# Patient Record
Sex: MALE | Race: WHITE
[De-identification: names, ages, dates, MRNs, and addresses within clinical notes are randomized per-mention and may not be internally consistent; named-entity substitution may affect disease eponyms.]

---

## 2021-04-26 ENCOUNTER — HOSPITAL ENCOUNTER (OUTPATIENT)
Dept: HOSPITAL 83 - LAB | Age: 66
Discharge: HOME | End: 2021-04-26
Attending: FAMILY MEDICINE
Payer: COMMERCIAL

## 2021-04-26 DIAGNOSIS — R79.89: ICD-10-CM

## 2021-04-26 DIAGNOSIS — Z12.5: Primary | ICD-10-CM

## 2021-04-26 DIAGNOSIS — R53.83: ICD-10-CM

## 2021-04-26 LAB
ALBUMIN SERPL-MCNC: 3.6 GM/DL (ref 3.1–4.5)
ALP SERPL-CCNC: 115 U/L (ref 45–117)
ALT SERPL W P-5'-P-CCNC: 17 U/L (ref 12–78)
AST SERPL-CCNC: 11 IU/L (ref 3–35)
BACTERIA #/AREA URNS HPF: (no result) /[HPF]
BASOPHILS # BLD AUTO: 0.1 10*3/UL (ref 0–0.1)
BASOPHILS NFR BLD AUTO: 0.6 % (ref 0–1)
BUN SERPL-MCNC: 14 MG/DL (ref 7–24)
CHLORIDE SERPL-SCNC: 108 MMOL/L (ref 98–107)
CHOLEST SERPL-MCNC: 211 MG/DL (ref ?–200)
CREAT SERPL-MCNC: 1.15 MG/DL (ref 0.7–1.3)
EOSINOPHIL # BLD AUTO: 0.3 10*3/UL (ref 0–0.4)
EOSINOPHIL # BLD AUTO: 3.7 % (ref 1–4)
EPI CELLS #/AREA URNS HPF: (no result) /[HPF]
ERYTHROCYTE [DISTWIDTH] IN BLOOD BY AUTOMATED COUNT: 13.9 % (ref 0–14.5)
HCT VFR BLD AUTO: 46.2 % (ref 42–52)
IRON SERPL-MCNC: 54 UG/DL (ref 65–175)
LDLC SERPL DIRECT ASSAY-MCNC: 148 MG/DL (ref 9–159)
LYMPHOCYTES # BLD AUTO: 2.3 10*3/UL (ref 1.3–4.4)
LYMPHOCYTES NFR BLD AUTO: 29.2 % (ref 27–41)
MCH RBC QN AUTO: 29.8 PG (ref 27–31)
MCHC RBC AUTO-ENTMCNC: 33.8 G/DL (ref 33–37)
MCV RBC AUTO: 88.2 FL (ref 80–94)
MONOCYTES # BLD AUTO: 0.5 10*3/UL (ref 0.1–1)
MONOCYTES NFR BLD MANUAL: 6.6 % (ref 3–9)
MUCOUS THREADS URNS QL MICRO: (no result)
NEUT #: 4.6 10*3/UL (ref 2.3–7.9)
NEUT %: 59.6 % (ref 47–73)
NRBC BLD QL AUTO: 0 10*3/UL (ref 0–0)
PH UR STRIP: 5.5 [PH] (ref 4.5–8)
PLATELET # BLD AUTO: 267 10*3/UL (ref 130–400)
PMV BLD AUTO: 9.8 FL (ref 9.6–12.3)
POTASSIUM SERPL-SCNC: 3.9 MMOL/L (ref 3.5–5.1)
PROT SERPL-MCNC: 7.2 GM/DL (ref 6.4–8.2)
RBC # BLD AUTO: 5.24 10*6/UL (ref 4.5–5.9)
RETICS/RBC NFR AUTO: 0.92 % (ref 0.5–2.5)
SODIUM SERPL-SCNC: 140 MMOL/L (ref 136–145)
SP GR UR: >= 1.03 (ref 1–1.03)
TIBC SERPL-MCNC: 349 UG/DL (ref 250–450)
TRIGL SERPL-MCNC: 142 MG/DL (ref ?–150)
TSH SERPL DL<=0.005 MIU/L-ACNC: 0.47 UIU/ML (ref 0.36–4.75)
UROBILINOGEN UR STRIP-MCNC: 1 E.U./DL (ref 0–1)
WBC #/AREA URNS HPF: (no result) WBC/HPF (ref 0–5)
WBC NRBC COR # BLD AUTO: 7.7 10*3/UL (ref 4.8–10.8)

## 2022-04-03 ENCOUNTER — HOSPITAL ENCOUNTER (INPATIENT)
Age: 67
DRG: 247 | End: 2022-04-03
Attending: INTERNAL MEDICINE | Admitting: INTERNAL MEDICINE
Payer: MEDICARE

## 2022-04-03 ENCOUNTER — HOSPITAL ENCOUNTER (INPATIENT)
Age: 67
LOS: 3 days | Discharge: HOME | DRG: 247 | End: 2022-04-06
Attending: INTERNAL MEDICINE | Admitting: INTERNAL MEDICINE
Payer: MEDICARE

## 2022-04-03 PROBLEM — I21.4 NSTEMI (NON-ST ELEVATED MYOCARDIAL INFARCTION) (HCC): Status: ACTIVE | Noted: 2022-04-03

## 2022-04-03 LAB
ACETAMINOPHEN LEVEL: <5 MCG/ML (ref 10–30)
ALBUMIN SERPL-MCNC: 3.6 G/DL (ref 3.5–5.2)
ALP BLD-CCNC: 107 U/L (ref 40–129)
ALT SERPL-CCNC: 38 U/L (ref 0–40)
ANION GAP SERPL CALCULATED.3IONS-SCNC: 8 MMOL/L (ref 7–16)
APTT: 38 SEC (ref 24.5–35.1)
AST SERPL-CCNC: 225 U/L (ref 0–39)
BASOPHILS ABSOLUTE: 0.01 E9/L (ref 0–0.2)
BASOPHILS RELATIVE PERCENT: 0.1 % (ref 0–2)
BILIRUB SERPL-MCNC: 1.6 MG/DL (ref 0–1.2)
BUN BLDV-MCNC: 21 MG/DL (ref 6–23)
CALCIUM SERPL-MCNC: 8.5 MG/DL (ref 8.6–10.2)
CHLORIDE BLD-SCNC: 108 MMOL/L (ref 98–107)
CO2: 21 MMOL/L (ref 22–29)
CREAT SERPL-MCNC: 1.1 MG/DL (ref 0.7–1.2)
D DIMER: <200 NG/ML DDU
EOSINOPHILS ABSOLUTE: 0 E9/L (ref 0.05–0.5)
EOSINOPHILS RELATIVE PERCENT: 0 % (ref 0–6)
ETHANOL: <10 MG/DL (ref 0–0.08)
GFR AFRICAN AMERICAN: >60
GFR NON-AFRICAN AMERICAN: >60 ML/MIN/1.73
GLUCOSE BLD-MCNC: 118 MG/DL (ref 74–99)
HCT VFR BLD CALC: 38 % (ref 37–54)
HEMOGLOBIN: 13 G/DL (ref 12.5–16.5)
IMMATURE GRANULOCYTES #: 0.06 E9/L
IMMATURE GRANULOCYTES %: 0.5 % (ref 0–5)
LYMPHOCYTES ABSOLUTE: 1.45 E9/L (ref 1.5–4)
LYMPHOCYTES RELATIVE PERCENT: 11.4 % (ref 20–42)
MCH RBC QN AUTO: 30 PG (ref 26–35)
MCHC RBC AUTO-ENTMCNC: 34.2 % (ref 32–34.5)
MCV RBC AUTO: 87.6 FL (ref 80–99.9)
MONOCYTES ABSOLUTE: 1.09 E9/L (ref 0.1–0.95)
MONOCYTES RELATIVE PERCENT: 8.6 % (ref 2–12)
NEUTROPHILS ABSOLUTE: 10.12 E9/L (ref 1.8–7.3)
NEUTROPHILS RELATIVE PERCENT: 79.4 % (ref 43–80)
PDW BLD-RTO: 14.1 FL (ref 11.5–15)
PLATELET # BLD: 228 E9/L (ref 130–450)
PMV BLD AUTO: 10 FL (ref 7–12)
POTASSIUM SERPL-SCNC: 4.2 MMOL/L (ref 3.5–5)
RBC # BLD: 4.34 E12/L (ref 3.8–5.8)
SALICYLATE, SERUM: <0.3 MG/DL (ref 0–30)
SODIUM BLD-SCNC: 137 MMOL/L (ref 132–146)
TOTAL PROTEIN: 6.6 G/DL (ref 6.4–8.3)
TRICYCLIC ANTIDEPRESSANTS SCREEN SERUM: NEGATIVE NG/ML
TROPONIN, HIGH SENSITIVITY: 1556 NG/L (ref 0–11)
TROPONIN, HIGH SENSITIVITY: 2172 NG/L (ref 0–11)
TROPONIN, HIGH SENSITIVITY: 3613 NG/L (ref 0–11)
WBC # BLD: 12.7 E9/L (ref 4.5–11.5)

## 2022-04-03 PROCEDURE — 80179 DRUG ASSAY SALICYLATE: CPT

## 2022-04-03 PROCEDURE — 93005 ELECTROCARDIOGRAM TRACING: CPT | Performed by: INTERNAL MEDICINE

## 2022-04-03 PROCEDURE — 85730 THROMBOPLASTIN TIME PARTIAL: CPT

## 2022-04-03 PROCEDURE — 2700000000 HC OXYGEN THERAPY PER DAY

## 2022-04-03 PROCEDURE — 82077 ASSAY SPEC XCP UR&BREATH IA: CPT

## 2022-04-03 PROCEDURE — 2580000003 HC RX 258: Performed by: STUDENT IN AN ORGANIZED HEALTH CARE EDUCATION/TRAINING PROGRAM

## 2022-04-03 PROCEDURE — 6370000000 HC RX 637 (ALT 250 FOR IP): Performed by: STUDENT IN AN ORGANIZED HEALTH CARE EDUCATION/TRAINING PROGRAM

## 2022-04-03 PROCEDURE — 80074 ACUTE HEPATITIS PANEL: CPT

## 2022-04-03 PROCEDURE — 84484 ASSAY OF TROPONIN QUANT: CPT

## 2022-04-03 PROCEDURE — 80143 DRUG ASSAY ACETAMINOPHEN: CPT

## 2022-04-03 PROCEDURE — 85025 COMPLETE CBC W/AUTO DIFF WBC: CPT

## 2022-04-03 PROCEDURE — 2580000003 HC RX 258: Performed by: INTERNAL MEDICINE

## 2022-04-03 PROCEDURE — 80307 DRUG TEST PRSMV CHEM ANLYZR: CPT

## 2022-04-03 PROCEDURE — 85378 FIBRIN DEGRADE SEMIQUANT: CPT

## 2022-04-03 PROCEDURE — 36415 COLL VENOUS BLD VENIPUNCTURE: CPT

## 2022-04-03 PROCEDURE — 80053 COMPREHEN METABOLIC PANEL: CPT

## 2022-04-03 PROCEDURE — 2000000000 HC ICU R&B

## 2022-04-03 PROCEDURE — 6360000002 HC RX W HCPCS: Performed by: STUDENT IN AN ORGANIZED HEALTH CARE EDUCATION/TRAINING PROGRAM

## 2022-04-03 RX ORDER — ATORVASTATIN CALCIUM 40 MG/1
80 TABLET, FILM COATED ORAL NIGHTLY
Status: DISCONTINUED | OUTPATIENT
Start: 2022-04-03 | End: 2022-04-05

## 2022-04-03 RX ORDER — CLOPIDOGREL BISULFATE 75 MG/1
75 TABLET ORAL DAILY
Status: DISCONTINUED | OUTPATIENT
Start: 2022-04-03 | End: 2022-04-04

## 2022-04-03 RX ORDER — 0.9 % SODIUM CHLORIDE 0.9 %
250 INTRAVENOUS SOLUTION INTRAVENOUS ONCE
Status: COMPLETED | OUTPATIENT
Start: 2022-04-03 | End: 2022-04-03

## 2022-04-03 RX ORDER — SODIUM CHLORIDE 0.9 % (FLUSH) 0.9 %
5-40 SYRINGE (ML) INJECTION EVERY 12 HOURS SCHEDULED
Status: DISCONTINUED | OUTPATIENT
Start: 2022-04-03 | End: 2022-04-04 | Stop reason: SDUPTHER

## 2022-04-03 RX ORDER — HEPARIN SODIUM 10000 [USP'U]/100ML
5-30 INJECTION, SOLUTION INTRAVENOUS CONTINUOUS
Status: DISCONTINUED | OUTPATIENT
Start: 2022-04-03 | End: 2022-04-04

## 2022-04-03 RX ORDER — SODIUM CHLORIDE 0.9 % (FLUSH) 0.9 %
5-40 SYRINGE (ML) INJECTION PRN
Status: DISCONTINUED | OUTPATIENT
Start: 2022-04-03 | End: 2022-04-04 | Stop reason: SDUPTHER

## 2022-04-03 RX ORDER — MORPHINE SULFATE 2 MG/ML
2 INJECTION, SOLUTION INTRAMUSCULAR; INTRAVENOUS EVERY 4 HOURS PRN
Status: DISCONTINUED | OUTPATIENT
Start: 2022-04-03 | End: 2022-04-06 | Stop reason: HOSPADM

## 2022-04-03 RX ORDER — ONDANSETRON 4 MG/1
4 TABLET, ORALLY DISINTEGRATING ORAL EVERY 8 HOURS PRN
Status: DISCONTINUED | OUTPATIENT
Start: 2022-04-03 | End: 2022-04-06 | Stop reason: HOSPADM

## 2022-04-03 RX ORDER — ACETAMINOPHEN 325 MG/1
650 TABLET ORAL EVERY 6 HOURS PRN
Status: DISCONTINUED | OUTPATIENT
Start: 2022-04-03 | End: 2022-04-04 | Stop reason: SDUPTHER

## 2022-04-03 RX ORDER — HEPARIN SODIUM 1000 [USP'U]/ML
4000 INJECTION, SOLUTION INTRAVENOUS; SUBCUTANEOUS ONCE
Status: COMPLETED | OUTPATIENT
Start: 2022-04-03 | End: 2022-04-03

## 2022-04-03 RX ORDER — ONDANSETRON 2 MG/ML
4 INJECTION INTRAMUSCULAR; INTRAVENOUS EVERY 6 HOURS PRN
Status: DISCONTINUED | OUTPATIENT
Start: 2022-04-03 | End: 2022-04-06 | Stop reason: HOSPADM

## 2022-04-03 RX ORDER — PAROXETINE HYDROCHLORIDE 20 MG/1
20 TABLET, FILM COATED ORAL EVERY MORNING
COMMUNITY

## 2022-04-03 RX ORDER — POLYETHYLENE GLYCOL 3350 17 G/17G
17 POWDER, FOR SOLUTION ORAL DAILY PRN
Status: DISCONTINUED | OUTPATIENT
Start: 2022-04-03 | End: 2022-04-06 | Stop reason: HOSPADM

## 2022-04-03 RX ORDER — SODIUM CHLORIDE 9 MG/ML
INJECTION, SOLUTION INTRAVENOUS CONTINUOUS
Status: DISCONTINUED | OUTPATIENT
Start: 2022-04-03 | End: 2022-04-04

## 2022-04-03 RX ORDER — HEPARIN SODIUM 1000 [USP'U]/ML
4000 INJECTION, SOLUTION INTRAVENOUS; SUBCUTANEOUS PRN
Status: DISCONTINUED | OUTPATIENT
Start: 2022-04-03 | End: 2022-04-04 | Stop reason: ALTCHOICE

## 2022-04-03 RX ORDER — SODIUM CHLORIDE 9 MG/ML
INJECTION, SOLUTION INTRAVENOUS PRN
Status: DISCONTINUED | OUTPATIENT
Start: 2022-04-03 | End: 2022-04-04 | Stop reason: SDUPTHER

## 2022-04-03 RX ORDER — HEPARIN SODIUM 1000 [USP'U]/ML
2000 INJECTION, SOLUTION INTRAVENOUS; SUBCUTANEOUS PRN
Status: DISCONTINUED | OUTPATIENT
Start: 2022-04-03 | End: 2022-04-04 | Stop reason: ALTCHOICE

## 2022-04-03 RX ORDER — BUSPIRONE HYDROCHLORIDE 5 MG/1
5 TABLET ORAL DAILY
COMMUNITY

## 2022-04-03 RX ORDER — ASPIRIN 81 MG/1
81 TABLET, CHEWABLE ORAL DAILY
Status: DISCONTINUED | OUTPATIENT
Start: 2022-04-03 | End: 2022-04-06 | Stop reason: HOSPADM

## 2022-04-03 RX ORDER — ACETAMINOPHEN 650 MG/1
650 SUPPOSITORY RECTAL EVERY 6 HOURS PRN
Status: DISCONTINUED | OUTPATIENT
Start: 2022-04-03 | End: 2022-04-06 | Stop reason: HOSPADM

## 2022-04-03 RX ORDER — NITROGLYCERIN 0.4 MG/1
0.4 TABLET SUBLINGUAL EVERY 5 MIN PRN
Status: DISCONTINUED | OUTPATIENT
Start: 2022-04-03 | End: 2022-04-06 | Stop reason: HOSPADM

## 2022-04-03 RX ORDER — TAMSULOSIN HYDROCHLORIDE 0.4 MG/1
0.4 CAPSULE ORAL DAILY
Status: ON HOLD | COMMUNITY
End: 2022-04-19 | Stop reason: HOSPADM

## 2022-04-03 RX ORDER — OMEPRAZOLE 20 MG/1
20 CAPSULE, DELAYED RELEASE ORAL DAILY
COMMUNITY

## 2022-04-03 RX ORDER — DOBUTAMINE HYDROCHLORIDE 400 MG/100ML
2.5-1 INJECTION INTRAVENOUS CONTINUOUS
Status: DISCONTINUED | OUTPATIENT
Start: 2022-04-03 | End: 2022-04-03

## 2022-04-03 RX ADMIN — SODIUM CHLORIDE: 9 INJECTION, SOLUTION INTRAVENOUS at 19:47

## 2022-04-03 RX ADMIN — HEPARIN SODIUM 4000 UNITS: 1000 INJECTION INTRAVENOUS; SUBCUTANEOUS at 17:46

## 2022-04-03 RX ADMIN — ATORVASTATIN CALCIUM 80 MG: 40 TABLET, FILM COATED ORAL at 21:02

## 2022-04-03 RX ADMIN — HEPARIN SODIUM AND DEXTROSE 11 UNITS/KG/HR: 10000; 5 INJECTION INTRAVENOUS at 17:45

## 2022-04-03 RX ADMIN — SODIUM CHLORIDE 250 ML: 9 INJECTION, SOLUTION INTRAVENOUS at 17:50

## 2022-04-03 RX ADMIN — SODIUM CHLORIDE, PRESERVATIVE FREE 10 ML: 5 INJECTION INTRAVENOUS at 21:03

## 2022-04-03 ASSESSMENT — ENCOUNTER SYMPTOMS
ABDOMINAL PAIN: 0
DIARRHEA: 0
BLOOD IN STOOL: 0
COUGH: 0
VOMITING: 0
CHEST TIGHTNESS: 0
CONSTIPATION: 0
NAUSEA: 1
SHORTNESS OF BREATH: 0

## 2022-04-03 ASSESSMENT — PAIN SCALES - GENERAL
PAINLEVEL_OUTOF10: 0

## 2022-04-03 NOTE — CONSULTS
Medical Intensive Care Unit     [facility]  MICU Consult Note    Date and time: 4/3/2022 4:58 PM  Patient's name:  Elsie Lion Record Number: [id number]  Patient's account/billing number: [de-identified]  Patient's YOB: 1955  Age: 73 y.o. Date of Admission: 4/3/2022  2:22 PM  Length of stay during current admission: 0    Primary Care Physician: No primary care provider on file. ICU Attending Physician: Dr. Giorgi Call Status: Full Code    Reason for ICU admission: NSTEMI    History of Present Illness:   Anurag Doherty is a 50-year-old male with past medical history of GERD, BPH and anxiety who presented from [facility] with non-STEMI. He states that he began to have diaphoresis and shortness of breath yesterday after shoveling gravel for 2 hours. Symptoms were not relieved with rest.  He took a shower and continued to be symptomatic. During the night, he felt nauseous and had chills. This morning, he felt more fatigued. His wife thought he was dehydrated took him to the ED. In the ED, his blood pressure was 99/74 and bradycardic at 56. Not in respiratory distress. EKG showed sinus bradycardia with PACs. Repeat EKG 1 hour later showed sloping of ST segments and V1 and V2. Labs are significant for elevated white count 11.9, elevated creatinine 1.4 (he has no baseline), total bili 2,  and . D-dimer was elevated at 594. Trop I cycled 20, 23. CK-. VBG 7.47 / 31 / 57 / 22.6 (respiratory alkalosis). He was given aspirin, Lovenox, NS bolus and Zofran. Was started on dopamine drip. blood pressure slightly improved. Cardiology evaluated there and suspected NSTEMI so he was transferred to [address] [place] [address] ICU. Upon examining the patient, he was in no acute distress. Continued to be hypotensive. Denies chest pain, shortness of breath, abdominal pain, urinary or bowel changes.   Stated he has not been eating or drinking much since yesterday. Denies prior episodes with the symptoms. No cardiac history. Endorses stress test 15 years ago. Family history of diabetes in father. Denies nicotine and alcohol use. Smokes marijuana daily since the 70s. Only takes Paxil, BuSpar, Flomax, Prilosec. CURRENT VENTILATION STATUS:   [] Ventilator  [] BIPAP  [x] Nasal Cannula [] Room Air      IF INTUBATED, ET TUBE MARKING AT LOWER LIP:       cms    SECRETIONS   Amount:  [] Small [] Moderate  [] Large [x] None    Color:     [] White [] Colored  [] Bloody    SEDATION:  RAAS Score:  [] Propofol gtt  [] Versed gtt  [] Ativan gtt   [x] No Sedation    PARALYZED:  [x] No    [] Yes    VASOPRESSORS:  [x] No    [] Yes    If yes -   [] Levophed       [] Dopamine     [] Vasopressin       [] Dobutamine  [] Phenylephrine         [] Epinephrine    CENTRAL LINES:     [x] No   [] Yes   (Date of Insertion:   )           If yes -     [] Right IJ     [] Left IJ [] Right Femoral [] Left Femoral                   [] Right Subclavian [] Left Subclavian    REVIEW OF SYSTEMS:  Review of Systems   Constitutional: Positive for fatigue. Negative for chills, diaphoresis and fever. Respiratory: Negative for cough, chest tightness and shortness of breath. Cardiovascular: Negative for chest pain, palpitations and leg swelling. Gastrointestinal: Positive for nausea. Negative for abdominal pain, blood in stool, constipation, diarrhea and vomiting. Genitourinary: Negative for dysuria and hematuria. Neurological: Negative for dizziness, syncope, light-headedness and headaches.        OBJECTIVE:     VITAL SIGNS:  BP (!) 97/57   Pulse 66   Temp 99.6 °F (37.6 °C) (Oral)   Resp 16   Ht 6' 2\" (1.88 m)   Wt 200 lb (90.7 kg)   SpO2 97%   BMI 25.68 kg/m²   Tmax over 24 hours:  Temp (24hrs), Av.6 °F (37.6 °C), Min:99.6 °F (37.6 °C), Max:99.6 °F (37.6 °C)      Patient Vitals for the past 6 hrs:   BP Temp Temp src Pulse Resp SpO2 Height Weight   22 1640 -- -- -- -- 16 97 % -- --   04/03/22 1530 -- -- -- -- -- -- 6' 2\" (1.88 m) 200 lb (90.7 kg)   04/03/22 1445 (!) 97/57 -- -- 66 18 98 % -- --   04/03/22 1425 92/62 99.6 °F (37.6 °C) Oral 60 23 97 % -- --   04/03/22 1400 99/65 99.6 °F (37.6 °C) Oral 56 22 99 % -- --       No intake or output data in the 24 hours ending 04/03/22 1658  Wt Readings from Last 2 Encounters:   04/03/22 200 lb (90.7 kg)     Body mass index is 25.68 kg/m². PHYSICAL EXAMINATION:  Physical Exam:  Vitals: BP (!) 97/57   Pulse 66   Temp 99.6 °F (37.6 °C) (Oral)   Resp 16   Ht 6' 2\" (1.88 m)   Wt 200 lb (90.7 kg)   SpO2 97%   BMI 25.68 kg/m²     I & O - 24hr: No intake/output data recorded. Physical Exam  Constitutional:       General: He is not in acute distress. Appearance: Normal appearance. HENT:      Mouth/Throat:      Mouth: Mucous membranes are moist.      Pharynx: Oropharynx is clear. Eyes:      General: No scleral icterus. Extraocular Movements: Extraocular movements intact. Conjunctiva/sclera: Conjunctivae normal.   Neck:      Vascular: No carotid bruit or JVD. Cardiovascular:      Rate and Rhythm: Normal rate and regular rhythm. Heart sounds: No murmur heard. Abdominal:      General: Abdomen is flat. Bowel sounds are normal. There is no distension. Palpations: Abdomen is soft. Tenderness: There is no abdominal tenderness. Musculoskeletal:         General: No swelling or tenderness. Cervical back: Normal range of motion and neck supple. Skin:     General: Skin is warm and dry. Capillary Refill: Capillary refill takes less than 2 seconds. Neurological:      General: No focal deficit present. Mental Status: He is alert and oriented to person, place, and time.    Psychiatric:         Mood and Affect: Mood normal.         Behavior: Behavior normal.         MEDICATIONS:  Scheduled Meds:   aspirin  81 mg Oral Daily    clopidogrel  75 mg Oral Daily    atorvastatin  80 mg Oral Nightly heparin (porcine)  4,000 Units IntraVENous Once    sodium chloride flush  5-40 mL IntraVENous 2 times per day     Continuous Infusions:   heparin (PORCINE) Infusion      sodium chloride       PRN Meds:   morphine, 2 mg, Q4H PRN  nitroGLYCERIN, 0.4 mg, Q5 Min PRN  heparin (porcine), 4,000 Units, PRN  heparin (porcine), 2,000 Units, PRN  sodium chloride flush, 5-40 mL, PRN  sodium chloride, , PRN  ondansetron, 4 mg, Q8H PRN   Or  ondansetron, 4 mg, Q6H PRN  polyethylene glycol, 17 g, Daily PRN  acetaminophen, 650 mg, Q6H PRN   Or  acetaminophen, 650 mg, Q6H PRN        VENT SETTINGS (Comprehensive) (if applicable):  Vent Information  SpO2: 97 %  Additional Respiratory  Assessments  Pulse: 66  Resp: 16  SpO2: 97 %    ABGs:   No results for input(s): PH, PCO2, PO2, HCO3, BE, O2SAT in the last 72 hours. Laboratory findings:  Complete Blood Count: No results for input(s): WBC, HGB, HCT, PLT in the last 72 hours. Last 3 Blood Glucose:   No results for input(s): GLUCOSE in the last 72 hours. PT/INR:  No results found for: PROTIME, INR  PTT:  No results found for: APTT, PTT    Comprehensive Metabolic Profile:   No results for input(s): NA, K, CL, CO2, BUN, CREATININE, GLUCOSE, CALCIUM, PROT, LABALBU, BILITOT, ALKPHOS, AST, ALT in the last 72 hours. Magnesium: No results found for: MG  Phosphorus: No results found for: PHOS  Ionized Calcium: No results found for: CAION   Troponin: No results for input(s): TROPONINI in the last 72 hours. Urinalysis: Urine dipstick shows not done. Micro exam: not done. Microbiology:  Notable Cultures:      Blood cultures No results found for: BC  Respiratory cultures No results found for: RESPCULTURE No results found for: LABGRAM  Urine No results found for: LABURIN  Legionella No results found for: LABLEGI  C Diff PCR No results found for: CDIFPCR  Wound culture/abscess: No results for input(s): WNDABS in the last 72 hours.   Tip culture:No results for input(s): CXCATHTIP in the last 72 hours. Radiology/Imaging:   Chest Xray (4/3/2022):    ASSESSMENT  And PLAN:    Efraín Karimi is a 79-year-old male with past medical history of GERD, BPH and anxiety who presented with SOB likely 2/2 NSTEMI. He is currently being managed for:       Cardio  NSTEMI   Presented with 1 day history shortness of breath and diaphoresis  Troponin I 20, 23 and CK- at [facility]  Troponin high-sensitivity here 1556; cycle x2 every 6 hours  Repeat EKG showed sinus rhythm with frequent premature PVCs, with no st segment changes   O2, Nitro SL PRN, Heparin, morphine PRN   plavix and BB on hold   Cardio consulted: cath tomorrow if remains asymptomatic; if worsening sx tonight, emergent cath; NPO at midnight       Pulm  Shortness of breath  Likely 2/2 angina  D-dimer elevated, repeat   Already on heparin drip for NSTEMI if PE was suspected       GI  GERD   On Prilosec at home, continue    Elevated AST   ALT, ALP wnl; bilirubin slightly elevated 1.6  Hepatitis panel ordered   Ethanol level   SDS/UDS       Renal/   MALLIKA  Cr at [facility] 1.4, no baseline   Likely 2/2 decreased p.o. intake   Repeat BMP  NICOM was fluid responsive, give 250 cc bolus       Psych   Anxiety   On buspar and paxel at home, hold for now         ICU PROPHYLAXIS:  Stress ulcer:  [x] PPI Agent  [] [id number] [] Sucralfate  [] Other:  VTE:   [] Enoxaparin  [x] Unfract. Heparin Subcut  [] EPC Cuffs    NUTRITION:  [] NPO [] Tube Feeding (Specify: ) [] TPN  [] PO (Diet: ADULT DIET; Clear Liquid  Diet NPO after midnight)     Maia Lares MD, MD PGY-1  Attending Physician: Dr. Lenora Fischer   4/3/2022, 4:58 PM      I personally saw, examined and provided care for the patient. Radiographs, labs and medication list were reviewed by me independently. I spoke with bedside nursing, therapists and consultants. Critical care services and times documented are independent of procedures and multidisciplinary rounds with Residents.  Additionally comprehensive, multidisciplinary rounds were conducted with the MICU team. The case was discussed in detail and plans for care were established. Review of Residents documentation was conducted and revisions were made as appropriate. I agree with the above documented exam, problem list and plan of care.    Katja Moss MD   CCT excluding procedures :39'

## 2022-04-03 NOTE — CONSULTS
[facility] cardiology/the [address]    Name: Dorenda Bumpers    Age: 73 y.o. Date of Admission: 4/3/2022  2:22 PM    Date of Service: 4/3/2022    Patient known to me as I saw him in the [place] emergency room this morning and reviewed EKGs and case with [place] emergency room physician. Reason for Consultation: Fatigue, shortness of breath, troponin 20 in [place] emergency room with normal being up to 0.1, abnormal EKG. Referring Physician:     History of Present Illness: The patient is a 73y.o. year old male who has been retired for 10 years and has no known history of cardiovascular disease, no diabetes or hypertension hyperlipidemia reports she shoveled to turn a lot yesterday over about 2 or 3 hours and subsequently felt very tired and worn out after that. Subsequently overnight did not feel very well. Just had some generalized fatigue and had trouble motivating to get off the couch due to just generalized fatigue. He had some vague shortness of breath and maybe some midepigastric discomfort but he also reports a history of prior reflux symptoms. He denies any significant diaphoresis or syncope. No history of bleeding issues. Past Medical History: Gastroesophageal reflux disease on Prilosec, BPH on Flomax. Denies any known history of cardiovascular disease. Past surgical history he denies any major surgeries. Review of Systems:     Constitutional: No fever, chills, sweats  Cardiac: As per HPI  Pulmonary: No cough, wheeze, hemoptysis  HEENT: No visual disturbances or difficult swallowing  GI: No nausea, vomiting, diarrhea, abdominal pain, rectal bleeding  : No dysuria or hematuria  Endocrine: No excessive thirst, heat or cold intolerance. Musculoskeletal: No joint pain or muscle aches.  No claudication  Skin: No skin breakdown or rashes  Neuro: No headache, confusion, or seizures  Psych: No depression, anxiety      Family History:  No family history on file. Social History:  Social History     Socioeconomic History    Marital status: Unknown     Spouse name: Not on file    Number of children: Not on file    Years of education: Not on file    Highest education level: Not on file   Occupational History    Not on file   Tobacco Use    Smoking status: Not on file    Smokeless tobacco: Not on file   Substance and Sexual Activity    Alcohol use: Not on file    Drug use: Not on file    Sexual activity: Not on file   Other Topics Concern    Not on file   Social History Narrative    Not on file     Social Determinants of Health     Financial Resource Strain:     Difficulty of Paying Living Expenses: Not on file   Food Insecurity:     Worried About Running Out of Food in the Last Year: Not on file    Mabel of Food in the Last Year: Not on file   Transportation Needs:     Lack of Transportation (Medical): Not on file    Lack of Transportation (Non-Medical):  Not on file   Physical Activity:     Days of Exercise per Week: Not on file    Minutes of Exercise per Session: Not on file   Stress:     Feeling of Stress : Not on file   Social Connections:     Frequency of Communication with Friends and Family: Not on file    Frequency of Social Gatherings with Friends and Family: Not on file    Attends Congregational Services: Not on file    Active Member of [address] [place] [address] [employer] or Organizations: Not on file    Attends Club or Organization Meetings: Not on file    Marital Status: Not on file   Intimate Partner Violence:     Fear of Current or Ex-Partner: Not on file    Emotionally Abused: Not on file    Physically Abused: Not on file    Sexually Abused: Not on file   Housing Stability:     Unable to Pay for Housing in the Last Year: Not on file    Number of [place] in the Last Year: Not on file    Unstable Housing in the Last Year: Not on file       Allergies:  No Known Allergies    Current Medications:  No current facility-administered medications for this encounter. Physical Exam:  BP (!) 97/57   Pulse 66   Temp 99.6 °F (37.6 °C) (Oral)   Resp 18   SpO2 98%   Weight change: Wt Readings from Last 3 Encounters:   No data found for Wt         General: Awake, alert, oriented x3, no acute distress  HEENT: Unremarkable  Neck: No JVD or bruits. Cardiac: Regular rate and rhythm, normal S1 and S2, no extra heart sounds, murmurs, heaves, thrills  Resp: Lungs clear without wheezing or crackles. No accessory muscle use or retraction  Abdomen: soft, nontender, nondistended, no gross organomegaly or mass  Skin: Warm and dry, no cyanosis. Musculoskeletal: normal tone and strength in the upper and lower extremities bilaterally  Neuro: Grossly unremarkable  Psych: Cooperative, and normal affect    Intake/Output:  No intake or output data in the 24 hours ending 04/03/22 1519  No intake/output data recorded. Laboratory Tests:  No results found for: CREATININE, BUN, NA, K, CL, CO2  No results for input(s): CKTOTAL, CKMB in the last 72 hours. Invalid input(s): TROPONONI  No results found for: BNP  No results found for: WBC, RBC, HGB, HCT, MCV, MCH, MCHC, RDW, PLT, MPV  No results for input(s): ALKPHOS, ALT, AST, PROT, BILITOT, BILIDIR, LABALBU in the last 72 hours. No results found for: MG  No results found for: PROTIME, INR  No results found for: TSH  No components found for: CHLPL  No results found for: TRIG  No results found for: HDL  No results found for: LDLCALC  No results found for: INR    Admission ECG approximately 930 this morning in [place] personally reviewed by me revealed normal sinus rhythm without ST elevation or ST depression but follow-up EKG at about 10:30 AM with new upsloping ST depression leads V1 and V2 with PVCs noted. Personally reviewed his telemetry and currently normal sinus rhythm heart rate about 60 with unifocal PVCs.     ASSESSMENT / PLAN:    1. Non-ST elevation MI based on troponin of 20 with normal reference up to 0.1 in [place], also they checked an MB and CPK-MB was 250. In [facility] given aspirin, beta-blocker, full dose Lovenox 1 mg/kg. Would also start atorvastatin 40 mg p.o. daily. He is an active functional gentleman and I discussed risks benefits and alternatives and options of management of likely recent acute coronary syndrome and I presented to him consideration of heart catheterization with possible percutaneous intervention all questions have been answered in the presence of his wife and he is agreed to proceed. Currently stable without chest pain or shortness of breath or distress. N.p.o. after midnight and plan heart catheterization tomorrow. The appropriate use criteria indication of 3 score of 8    #2 abnormal EKG with ST depression on the second EKG now resolved on EKG here at [facility]. #3 premature ventricular contractions appear to be unifocal.  Keep potassium 4 or greater magnesium 2 or greater. #4 smokes marijuana on a daily basis and I discussed with him this still has adverse cardiovascular effects and smoking cessation education reviewed with him and his wife. #5 bradycardia transiently heart rate in the mid 50s on no AV blocking agents. Probably would hold on a beta-blocker at this time. Would make him n.p.o. after midnight and clear liquids the rest of today as his stomach is upset by his report. Would not load him necessarily with Plavix at this time and would like to see coronary anatomy tomorrow before giving dual antiplatelet therapy.                 Electronically signed by Kb Dykes MD on 4/3/2022 at 3:19 PM

## 2022-04-03 NOTE — H&P
Inpatient H&P      PCP:  No primary care provider on file. Admitting Physician:  Mariel Rice DO  Consultants:  Cardiology, critical care  Chief Complaint:  No chief complaint on file. History of Present Illness  Sarina Barth is a 73 y.o. male who presents to [facility] complaining of fatigue, SOB. Sarina Barth has a past medical history that includes kidney stones, GERD, depression and anxiety. Lamont Butcher initially presented to [facility] with complaints of fatigue and shortness of breath. He was doing work outside yesterday shoveling 6000 pounds of gravel and subsequently felt very tired and worn out after that. Overnight he progressively felt more unwell. He admitted to generalized fatigue and some shortness of breath. He admitted to midepigastric discomfort. Admission ECG at [facility] revealed normal sinus rhythm without ST elevation or depressions, but follow-up EKG with new upsloping ST depressions in leads V1 and V2 with PVCs. Decision was made to transfer patient to [facility] for likely need for heart catheterization. Last Hospital Admission -   None in EMR    Last Echocardiogram -   None in EMR     ED TRIAGE VITALS  BP: (!) 94/56, Temp: 99.6 °F (37.6 °C), Pulse: 67, Resp: 16, SpO2: 97 %    Vitals:    04/03/22 1445 04/03/22 1530 04/03/22 1600 04/03/22 1640   BP: (!) 97/57  (!) 94/56    Pulse: 66  67    Resp: 18  14 16   Temp:       TempSrc:       SpO2: 98%  98% 97%   Weight:  200 lb (90.7 kg)     Height:  6' 2\" (1.88 m)           Histories  Past Medical History:   Diagnosis Date    Kidney stones      Past Surgical History:   Procedure Laterality Date    KIDNEY STONE SURGERY      2010     Family History   Problem Relation Age of Onset    Diabetes Father        Home Medications  Prior to Admission medications    Medication Sig Start Date End Date Taking?  Authorizing Provider   omeprazole (PRILOSEC) 20 MG delayed release capsule Take 20 mg by mouth daily   Yes Historical Provider, MD   busPIRone (BUSPAR) 5 MG tablet Take 5 mg by mouth daily   Yes Historical Provider, MD   PARoxetine (PAXIL) 20 MG tablet Take 20 mg by mouth every morning   Yes Historical Provider, MD   tamsulosin (FLOMAX) 0.4 MG capsule Take 0.4 mg by mouth daily   Yes Historical Provider, MD       Allergies  Patient has no known allergies. Social Hx  Social History     Socioeconomic History    Marital status:      Spouse name: Arun Ortega Number of children: 0    Years of education: Not on file    Highest education level: Not on file   Occupational History     Comment: retired   Tobacco Use    Smoking status: Current Every Day Smoker     Years: 50.00    Smokeless tobacco: Never Used   Vaping Use    Vaping Use: Some days    Substances: CBD, marijuana    Devices: Pre-filled pod   Substance and Sexual Activity    Alcohol use: Not Currently     Comment: does nopt drink alcohol    Drug use: Yes     Types: Marijuana (Weed)     Comment: smokes marijuana several times daily/no cigs    Sexual activity: Yes     Partners: Female   Other Topics Concern    Not on file   Social History Narrative    Not on file     Social Determinants of Health     Financial Resource Strain:     Difficulty of Paying Living Expenses: Not on file   Food Insecurity:     Worried About Running Out of Food in the Last Year: Not on file    Mabel of Food in the Last Year: Not on file   Transportation Needs:     Lack of Transportation (Medical): Not on file    Lack of Transportation (Non-Medical):  Not on file   Physical Activity:     Days of Exercise per Week: Not on file    Minutes of Exercise per Session: Not on file   Stress:     Feeling of Stress : Not on file   Social Connections:     Frequency of Communication with Friends and Family: Not on file    Frequency of Social Gatherings with Friends and Family: Not on file    Attends Scientologist Services: Not on file    Active Member of Clubs or Organizations: Not on file   [facility] Attends Club or Organization Meetings: Not on file    Marital Status: Not on file   Intimate Partner Violence:     Fear of Current or Ex-Partner: Not on file    Emotionally Abused: Not on file    Physically Abused: Not on file    Sexually Abused: Not on file   Housing Stability:     Unable to Pay for Housing in the Last Year: Not on file    Number of Grey in the Last Year: Not on file    Unstable Housing in the Last Year: Not on file       Review of Systems  All bolded are positive; please see HPI  General:  Fever, chills, diaphoresis, fatigue, malaise, night sweats, weight loss  Psychological:  Anxiety, disorientation, hallucinations. ENT:  Epistaxis, headaches, vertigo, visual changes. Cardiovascular:  Chest pain, irregular heartbeats, palpitations, paroxysmal nocturnal dyspnea. Respiratory:  Shortness of breath, coughing, sputum production, hemoptysis, wheezing, orthopnea.   Gastrointestinal:  Nausea, vomiting, diarrhea, heartburn, constipation, abdominal pain, hematemesis, hematochezia, melena, acholic stools  Genito-Urinary:  Dysuria, urgency, frequency, hematuria  Musculoskeletal:  Joint pain, joint stiffness, joint swelling, muscle pain  Neurology:  Headache, focal neurological deficits, weakness, numbness, paresthesia  Derm:  Rashes, ulcers, excoriations, bruising  Extremities:  Decreased ROM, peripheral edema, mottling    Physical Examination  Vitals:  BP (!) 94/56   Pulse 67   Temp 99.6 °F (37.6 °C) (Oral)   Resp 16   Ht 6' 2\" (1.88 m)   Wt 200 lb (90.7 kg)   SpO2 97%   BMI 25.68 kg/m²   General Appearance:  awake, alert, and oriented to person, place, time, and purpose; appears stated age and cooperative; no apparent distress no labored breathing  HEENT:  NCAT; PERRL; conjunctiva pink, sclera clear  Neck:  no adenopathy, bruit, JVD, tenderness, masses, or nodules; supple, symmetrical, trachea midline, thyroid not enlarged  Lung:  clear to auscultation bilaterally; no use of accessory muscles; no rhonchi, rales, or crackles  Heart:  regular rate and regular rhythm without murmur, rub, or gallop  Abdomen:  soft, nontender, nondistended; normoactive bowel sounds; no organomegaly  Extremities:  extremities normal, atraumatic, no cyanosis or edema  Musculokeletal:  no joint swelling, no muscle tenderness. ROM normal in all joints of extremities. Neurologic:  mental status A&Ox3, thought content appropriate; CN II-XII grossly intact; sensation intact, motor strength 5/5 globally; no slurred speech    Laboratory Data  Recent Results (from the past 24 hour(s))   CBC with Auto Differential    Collection Time: 04/03/22 11:20 AM   Result Value Ref Range    WBC 12.7 (H) 4.5 - 11.5 E9/L    RBC 4.34 3.80 - 5.80 E12/L    Hemoglobin 13.0 12.5 - 16.5 g/dL    Hematocrit 38.0 37.0 - 54.0 %    MCV 87.6 80.0 - 99.9 fL    MCH 30.0 26.0 - 35.0 pg    MCHC 34.2 32.0 - 34.5 %    RDW 14.1 11.5 - 15.0 fL    Platelets 773 196 - 056 E9/L    MPV 10.0 7.0 - 12.0 fL    Neutrophils % 79.4 43.0 - 80.0 %    Immature Granulocytes % 0.5 0.0 - 5.0 %    Lymphocytes % 11.4 (L) 20.0 - 42.0 %    Monocytes % 8.6 2.0 - 12.0 %    Eosinophils % 0.0 0.0 - 6.0 %    Basophils % 0.1 0.0 - 2.0 %    Neutrophils Absolute 10.12 (H) 1.80 - 7.30 E9/L    Immature Granulocytes # 0.06 E9/L    Lymphocytes Absolute 1.45 (L) 1.50 - 4.00 E9/L    Monocytes Absolute 1.09 (H) 0.10 - 0.95 E9/L    Eosinophils Absolute 0.00 (L) 0.05 - 0.50 E9/L    Basophils Absolute 0.01 0.00 - 0.20 E9/L   Troponin    Collection Time: 04/03/22  2:32 PM   Result Value Ref Range    Troponin, High Sensitivity 1,556 (H) 0 - 11 ng/L       Imaging  No results found. Assessment and Plan  Patient is a 73 y.o. male who presented with fatigue, SOB. The active problem list is as follows:    · NSTEMI-  Troponin 1,556>2,172. Aspirin/Statin. Cardiology following. Will likely need cardiac catheterization. NPO after midnight. On heparin drip.    · Hypotension- given BB at dion, continue to monitor   · Elevated AST  · Leukocytosis  · Marijuana use  · Bradycardia  · GERD  · History of Kidney stones  · Depression and anxiety      · Routine labs in the morning. · DVT prophylaxis. · Please see orders for further management and care.         Drew Ngo DO    5:53 PM  4/3/2022

## 2022-04-04 ENCOUNTER — APPOINTMENT (OUTPATIENT)
Dept: GENERAL RADIOLOGY | Age: 67
DRG: 247 | End: 2022-04-04
Attending: INTERNAL MEDICINE
Payer: MEDICARE

## 2022-04-04 PROBLEM — R07.9 CHEST PAIN: Status: ACTIVE | Noted: 2022-04-04

## 2022-04-04 LAB
ABO/RH: NORMAL
ALBUMIN SERPL-MCNC: 3.6 G/DL (ref 3.5–5.2)
ALP BLD-CCNC: 102 U/L (ref 40–129)
ALT SERPL-CCNC: 49 U/L (ref 0–40)
ANION GAP SERPL CALCULATED.3IONS-SCNC: 11 MMOL/L (ref 7–16)
ANTIBODY SCREEN: NORMAL
APTT: 35.9 SEC (ref 24.5–35.1)
APTT: 71.1 SEC (ref 24.5–35.1)
AST SERPL-CCNC: 223 U/L (ref 0–39)
BILIRUB SERPL-MCNC: 2.5 MG/DL (ref 0–1.2)
BUN BLDV-MCNC: 21 MG/DL (ref 6–23)
CALCIUM SERPL-MCNC: 8.1 MG/DL (ref 8.6–10.2)
CHLORIDE BLD-SCNC: 106 MMOL/L (ref 98–107)
CO2: 20 MMOL/L (ref 22–29)
CREAT SERPL-MCNC: 1.1 MG/DL (ref 0.7–1.2)
EKG ATRIAL RATE: 54 BPM
EKG ATRIAL RATE: 55 BPM
EKG P AXIS: 52 DEGREES
EKG P AXIS: 52 DEGREES
EKG P-R INTERVAL: 152 MS
EKG P-R INTERVAL: 154 MS
EKG Q-T INTERVAL: 408 MS
EKG Q-T INTERVAL: 424 MS
EKG QRS DURATION: 80 MS
EKG QRS DURATION: 84 MS
EKG QTC CALCULATION (BAZETT): 386 MS
EKG QTC CALCULATION (BAZETT): 405 MS
EKG R AXIS: -14 DEGREES
EKG R AXIS: -15 DEGREES
EKG T AXIS: -59 DEGREES
EKG T AXIS: -64 DEGREES
EKG VENTRICULAR RATE: 54 BPM
EKG VENTRICULAR RATE: 55 BPM
GFR AFRICAN AMERICAN: >60
GFR NON-AFRICAN AMERICAN: >60 ML/MIN/1.73
GLUCOSE BLD-MCNC: 118 MG/DL (ref 74–99)
HAV IGM SER IA-ACNC: NORMAL
HCT VFR BLD CALC: 37.4 % (ref 37–54)
HEMOGLOBIN: 12.5 G/DL (ref 12.5–16.5)
HEPATITIS B CORE IGM ANTIBODY: NORMAL
HEPATITIS B SURFACE ANTIGEN INTERPRETATION: NORMAL
HEPATITIS C ANTIBODY INTERPRETATION: NORMAL
LV EF: 53 %
LVEF MODALITY: NORMAL
MCH RBC QN AUTO: 30.2 PG (ref 26–35)
MCHC RBC AUTO-ENTMCNC: 33.4 % (ref 32–34.5)
MCV RBC AUTO: 90.3 FL (ref 80–99.9)
PDW BLD-RTO: 14.5 FL (ref 11.5–15)
PLATELET # BLD: 198 E9/L (ref 130–450)
PMV BLD AUTO: 10.6 FL (ref 7–12)
POC ACT LR: 166 SECONDS
POC ACT LR: 270 SECONDS
POTASSIUM SERPL-SCNC: 4.2 MMOL/L (ref 3.5–5)
PRO-BNP: 2648 PG/ML (ref 0–125)
RBC # BLD: 4.14 E12/L (ref 3.8–5.8)
SARS-COV-2, NAAT: NOT DETECTED
SODIUM BLD-SCNC: 137 MMOL/L (ref 132–146)
TOTAL PROTEIN: 6.2 G/DL (ref 6.4–8.3)
WBC # BLD: 11.9 E9/L (ref 4.5–11.5)

## 2022-04-04 PROCEDURE — 93010 ELECTROCARDIOGRAM REPORT: CPT | Performed by: INTERNAL MEDICINE

## 2022-04-04 PROCEDURE — 93458 L HRT ARTERY/VENTRICLE ANGIO: CPT

## 2022-04-04 PROCEDURE — 2700000000 HC OXYGEN THERAPY PER DAY

## 2022-04-04 PROCEDURE — 71045 X-RAY EXAM CHEST 1 VIEW: CPT

## 2022-04-04 PROCEDURE — 92943 PRQ TRLUML REVSC CH OCC ANT: CPT | Performed by: INTERNAL MEDICINE

## 2022-04-04 PROCEDURE — C1769 GUIDE WIRE: HCPCS

## 2022-04-04 PROCEDURE — 85027 COMPLETE CBC AUTOMATED: CPT

## 2022-04-04 PROCEDURE — 6360000002 HC RX W HCPCS: Performed by: STUDENT IN AN ORGANIZED HEALTH CARE EDUCATION/TRAINING PROGRAM

## 2022-04-04 PROCEDURE — B2111ZZ FLUOROSCOPY OF MULTIPLE CORONARY ARTERIES USING LOW OSMOLAR CONTRAST: ICD-10-PCS | Performed by: INTERNAL MEDICINE

## 2022-04-04 PROCEDURE — C9600 PERC DRUG-EL COR STENT SING: HCPCS

## 2022-04-04 PROCEDURE — 4A023N7 MEASUREMENT OF CARDIAC SAMPLING AND PRESSURE, LEFT HEART, PERCUTANEOUS APPROACH: ICD-10-PCS | Performed by: INTERNAL MEDICINE

## 2022-04-04 PROCEDURE — 92973 PRQ TRLUML C MCHN ASP THRMBC: CPT

## 2022-04-04 PROCEDURE — 6370000000 HC RX 637 (ALT 250 FOR IP): Performed by: INTERNAL MEDICINE

## 2022-04-04 PROCEDURE — 2709999900 HC NON-CHARGEABLE SUPPLY

## 2022-04-04 PROCEDURE — 2580000003 HC RX 258: Performed by: INTERNAL MEDICINE

## 2022-04-04 PROCEDURE — 93458 L HRT ARTERY/VENTRICLE ANGIO: CPT | Performed by: INTERNAL MEDICINE

## 2022-04-04 PROCEDURE — C1887 CATHETER, GUIDING: HCPCS

## 2022-04-04 PROCEDURE — 6370000000 HC RX 637 (ALT 250 FOR IP): Performed by: STUDENT IN AN ORGANIZED HEALTH CARE EDUCATION/TRAINING PROGRAM

## 2022-04-04 PROCEDURE — 80053 COMPREHEN METABOLIC PANEL: CPT

## 2022-04-04 PROCEDURE — 86901 BLOOD TYPING SEROLOGIC RH(D): CPT

## 2022-04-04 PROCEDURE — 2000000000 HC ICU R&B

## 2022-04-04 PROCEDURE — 93005 ELECTROCARDIOGRAM TRACING: CPT | Performed by: INTERNAL MEDICINE

## 2022-04-04 PROCEDURE — 85730 THROMBOPLASTIN TIME PARTIAL: CPT

## 2022-04-04 PROCEDURE — 86850 RBC ANTIBODY SCREEN: CPT

## 2022-04-04 PROCEDURE — 99233 SBSQ HOSP IP/OBS HIGH 50: CPT | Performed by: INTERNAL MEDICINE

## 2022-04-04 PROCEDURE — 93306 TTE W/DOPPLER COMPLETE: CPT

## 2022-04-04 PROCEDURE — C1725 CATH, TRANSLUMIN NON-LASER: HCPCS

## 2022-04-04 PROCEDURE — 83880 ASSAY OF NATRIURETIC PEPTIDE: CPT

## 2022-04-04 PROCEDURE — B2151ZZ FLUOROSCOPY OF LEFT HEART USING LOW OSMOLAR CONTRAST: ICD-10-PCS | Performed by: INTERNAL MEDICINE

## 2022-04-04 PROCEDURE — C1874 STENT, COATED/COV W/DEL SYS: HCPCS

## 2022-04-04 PROCEDURE — 87635 SARS-COV-2 COVID-19 AMP PRB: CPT

## 2022-04-04 PROCEDURE — 2720000010 HC SURG SUPPLY STERILE

## 2022-04-04 PROCEDURE — 85347 COAGULATION TIME ACTIVATED: CPT

## 2022-04-04 PROCEDURE — 6360000002 HC RX W HCPCS

## 2022-04-04 PROCEDURE — C1894 INTRO/SHEATH, NON-LASER: HCPCS

## 2022-04-04 PROCEDURE — 36415 COLL VENOUS BLD VENIPUNCTURE: CPT

## 2022-04-04 PROCEDURE — 2500000003 HC RX 250 WO HCPCS

## 2022-04-04 PROCEDURE — 6360000004 HC RX CONTRAST MEDICATION: Performed by: INTERNAL MEDICINE

## 2022-04-04 PROCEDURE — 6370000000 HC RX 637 (ALT 250 FOR IP)

## 2022-04-04 PROCEDURE — 027034Z DILATION OF CORONARY ARTERY, ONE ARTERY WITH DRUG-ELUTING INTRALUMINAL DEVICE, PERCUTANEOUS APPROACH: ICD-10-PCS | Performed by: INTERNAL MEDICINE

## 2022-04-04 PROCEDURE — 86900 BLOOD TYPING SEROLOGIC ABO: CPT

## 2022-04-04 RX ORDER — 0.9 % SODIUM CHLORIDE 0.9 %
500 INTRAVENOUS SOLUTION INTRAVENOUS ONCE
Status: COMPLETED | OUTPATIENT
Start: 2022-04-04 | End: 2022-04-04

## 2022-04-04 RX ORDER — SODIUM CHLORIDE 0.9 % (FLUSH) 0.9 %
5-40 SYRINGE (ML) INJECTION PRN
Status: DISCONTINUED | OUTPATIENT
Start: 2022-04-04 | End: 2022-04-06 | Stop reason: HOSPADM

## 2022-04-04 RX ORDER — PAROXETINE HYDROCHLORIDE 20 MG/1
20 TABLET, FILM COATED ORAL DAILY
Status: DISCONTINUED | OUTPATIENT
Start: 2022-04-04 | End: 2022-04-06 | Stop reason: HOSPADM

## 2022-04-04 RX ORDER — ACETAMINOPHEN 325 MG/1
650 TABLET ORAL EVERY 4 HOURS PRN
Status: DISCONTINUED | OUTPATIENT
Start: 2022-04-04 | End: 2022-04-06 | Stop reason: HOSPADM

## 2022-04-04 RX ORDER — SODIUM CHLORIDE 9 MG/ML
INJECTION, SOLUTION INTRAVENOUS CONTINUOUS
Status: ACTIVE | OUTPATIENT
Start: 2022-04-04 | End: 2022-04-04

## 2022-04-04 RX ORDER — BUSPIRONE HYDROCHLORIDE 5 MG/1
5 TABLET ORAL DAILY
Status: DISCONTINUED | OUTPATIENT
Start: 2022-04-04 | End: 2022-04-06 | Stop reason: HOSPADM

## 2022-04-04 RX ORDER — SODIUM CHLORIDE 0.9 % (FLUSH) 0.9 %
5-40 SYRINGE (ML) INJECTION EVERY 12 HOURS SCHEDULED
Status: DISCONTINUED | OUTPATIENT
Start: 2022-04-04 | End: 2022-04-06 | Stop reason: HOSPADM

## 2022-04-04 RX ORDER — SODIUM CHLORIDE 9 MG/ML
25 INJECTION, SOLUTION INTRAVENOUS PRN
Status: DISCONTINUED | OUTPATIENT
Start: 2022-04-04 | End: 2022-04-06 | Stop reason: HOSPADM

## 2022-04-04 RX ADMIN — PERFLUTREN 1.65 MG: 6.52 INJECTION, SUSPENSION INTRAVENOUS at 09:15

## 2022-04-04 RX ADMIN — HEPARIN SODIUM 2000 UNITS: 1000 INJECTION INTRAVENOUS; SUBCUTANEOUS at 08:19

## 2022-04-04 RX ADMIN — TICAGRELOR 90 MG: 90 TABLET ORAL at 20:19

## 2022-04-04 RX ADMIN — SODIUM CHLORIDE 500 ML: 9 INJECTION, SOLUTION INTRAVENOUS at 03:17

## 2022-04-04 RX ADMIN — ASPIRIN 81 MG 81 MG: 81 TABLET ORAL at 08:19

## 2022-04-04 RX ADMIN — BUSPIRONE HYDROCHLORIDE 5 MG: 5 TABLET ORAL at 15:58

## 2022-04-04 RX ADMIN — SODIUM CHLORIDE: 9 INJECTION, SOLUTION INTRAVENOUS at 05:21

## 2022-04-04 RX ADMIN — SODIUM CHLORIDE, PRESERVATIVE FREE 10 ML: 5 INJECTION INTRAVENOUS at 20:20

## 2022-04-04 RX ADMIN — SODIUM CHLORIDE, PRESERVATIVE FREE 10 ML: 5 INJECTION INTRAVENOUS at 10:53

## 2022-04-04 RX ADMIN — PAROXETINE 20 MG: 20 TABLET, FILM COATED ORAL at 15:58

## 2022-04-04 ASSESSMENT — PAIN SCALES - GENERAL
PAINLEVEL_OUTOF10: 0

## 2022-04-04 NOTE — PROGRESS NOTES
[facility]  CARDIOLOGY PROGRESS NOTE  [facility]        Subjective: Non-ST elevation myocardial infarction with very vague fatigue and tiredness but no chest symptoms or significant exertional limitations that I could elicit from his history. Last evening hypotension systolic in the 76E and received a couple of boluses IV saline and then maintenance normal saline overnight. He had no chest pain overnight. Heart catheterization completed today showed dominant left circumflex large vessel 100% mid occlusion and clot present. This afternoon appears to be comfortable had an episode of some diaphoresis about 2 hours after the heart catheterization. Objective: Medications lab chart and telemetry all reviewed.     Patient Vitals for the past 24 hrs:   BP Temp Temp src Pulse Resp SpO2 Weight   04/04/22 1600 129/78 -- -- 69 20 99 % --   04/04/22 1500 107/69 -- -- 65 21 98 % --   04/04/22 1400 98/73 -- -- 75 18 98 % --   04/04/22 1300 126/72 -- -- 59 18 97 % --   04/04/22 1200 99/76 98 °F (36.7 °C) Oral 76 16 96 % --   04/04/22 1100 104/68 -- -- 54 18 96 % --   04/04/22 1045 (!) 88/58 -- -- 54 14 97 % --   04/04/22 0830 112/74 -- -- 58 18 97 % --   04/04/22 0800 103/68 96.8 °F (36 °C) Oral 62 11 98 % --   04/04/22 0730 92/65 -- -- 54 24 94 % --   04/04/22 0700 98/66 -- -- 68 18 97 % --   04/04/22 0630 100/67 -- -- (!) 49 26 91 % --   04/04/22 0600 88/60 -- -- 54 27 93 % 202 lb 2.6 oz (91.7 kg)   04/04/22 0530 99/66 -- -- 61 22 92 % --   04/04/22 0500 (!) 90/57 -- -- 54 29 94 % --   04/04/22 0430 93/65 -- -- 54 27 91 % --   04/04/22 0400 (!) 89/58 97.7 °F (36.5 °C) -- 55 20 93 % --   04/04/22 0300 (!) 88/56 -- -- 54 25 94 % --   04/04/22 0200 (!) 89/57 -- -- 54 26 95 % --   04/04/22 0100 94/65 -- -- (!) 49 15 94 % --   04/04/22 0000 (!) 75/52 98.8 °F (37.1 °C) -- 56 25 96 % --   04/03/22 2330 (!) 76/47 -- -- 51 25 95 % --   04/03/22 2300 (!) 72/44 -- -- 56 22 95 % --   04/03/22 2230 (!) 79/50 -- -- 53 21 96 % --   04/03/22 2200 (!) 84/54 -- -- 55 22 95 % --   04/03/22 2130 (!) 88/57 -- -- 56 24 95 % --   04/03/22 2100 (!) 90/58 -- -- 60 25 95 % --   04/03/22 2030 (!) 86/58 -- -- 54 17 96 % --   04/03/22 2000 92/61 99 °F (37.2 °C) Oral 57 19 96 % --   04/03/22 1900 96/67 -- -- 53 17 96 % --   04/03/22 1846 (!) 92/58 -- -- 56 23 97 % --   04/03/22 1800 (!) 89/58 -- -- 56 16 98 % --   04/03/22 1700 (!) 92/56 -- -- 56 20 97 % --   04/03/22 1640 -- -- -- -- 16 97 % --         Intake/Output Summary (Last 24 hours) at 4/4/2022 1617  Last data filed at 4/4/2022 1610  Gross per 24 hour   Intake 3298.02 ml   Output 1025 ml   Net 2273.02 ml       Wt Readings from Last 3 Encounters:   04/04/22 202 lb 2.6 oz (91.7 kg)       Telemetry: Personally reviewed and shows sinus bradycardia no AV block or long pauses. Current meds: Scheduled Meds:   sodium chloride flush  5-40 mL IntraVENous 2 times per day    ticagrelor  90 mg Oral BID    busPIRone  5 mg Oral Daily    PARoxetine  20 mg Oral Daily    aspirin  81 mg Oral Daily    [Held by provider] atorvastatin  80 mg Oral Nightly     Continuous Infusions:   sodium chloride      sodium chloride       PRN Meds:.sodium chloride flush, sodium chloride, acetaminophen, morphine, nitroGLYCERIN, ondansetron **OR** ondansetron, polyethylene glycol, [DISCONTINUED] acetaminophen **OR** acetaminophen    Allergies: Patient has no known allergies. Labs:   Recent Labs     04/03/22  1120 04/04/22  0827   WBC 12.7* 11.9*   HGB 13.0 12.5   HCT 38.0 37.4   MCV 87.6 90.3    198       Labs:   Recent Labs     04/03/22  1120 04/04/22  0827    137   K 4.2 4.2   * 106   CO2 21* 20*   BUN 21 21   CREATININE 1.1 1.1       Labs: No results for input(s): CKTOTAL, CKMB, CKMBINDEX, TROPONINI in the last 72 hours. Labs: No results for input(s): BNP in the last 72 hours. Labs: No results for input(s): CHOL, HDL, TRIG in the last 72 hours.     Invalid input(s): [address], LDLCALCU    Labs:   Recent Labs     04/03/22  1120 04/04/22  0827   PROT 6.6 6.2*       Review of systems: No reported significant weight gain or weight loss. no dysuria or frequency, no dizziness, falls or trauma, no change in bowel or bladder habits, no hematochezia, hemoptysis or hematuria. No fevers, chills, nausea or vomiting reported. No significant wheezing or sputum production. No headache or visual changes. The remainder of the 10 review of systems otherwise negative. Exam      General: Patient comfortable in no distress and currently denies any chest pain. HEENT: Face symmetrical and no apparent cranial nerve deficit. Neck: No jugular venous distention, carotid bruit or thyromegaly. Lungs: Clear bilaterally without rales, wheezes or dullness. Cardiac: Regular rate and rhythm, no S3, S4, no rub or gallop. Abdomen: No rebound or guarding, no hepatosplenomegaly. Extremities: Without significant clubbing , cyanosis, or edema. Neuro:  No focal motor or sensory deficit apparent. Skin: No petechiae, no significant bruising. Assessment: See plan below        Plan: #1 non-ST elevation microinfarction and significantly elevated troponin overnight and heart catheterization done today from right wrist and stent to a large circumflex. Continue aspirin 81 mg daily, atorvastatin 80 mg daily, Brilinta 90 mg twice a day. #2 bradycardia and continue to avoid beta-blocker or AV blocking agents. #3 PVCs unifocal noted on telemetry but no arrhythmia. #4 Daily marijuana smoking and discussed with him again to quit and discussed risk of continued smoking. Continue to modify cardiovascular risk factors optimizing blood pressure blood sugar and lipids long-term. Will advance activity in the ICU up to chair and follow-up blood pressure. Possibly out to telemetry floor tomorrow morning from cardiology viewpoint.       Electronically signed by Juan Johnson MD on 4/4/2022 at 4:17 PM

## 2022-04-04 NOTE — ACP (ADVANCE CARE PLANNING)
Advance Care Planning   Healthcare Decision Maker:    Primary Decision Maker: Anny Michel [facility] - [phone]    Click here to complete Healthcare Decision Makers including selection of the Healthcare Decision Maker Relationship (ie \"Primary\").

## 2022-04-04 NOTE — PROGRESS NOTES
Dr. Hayley Springer notified of patient complaint of diaphoresis. Patients vitals stable. Patient has no other complaints at this time. Dr. Hayley Springer recommended monitoring patient. Will continue to monitor patient and will also notify Dr. Claudio Swain.

## 2022-04-04 NOTE — PLAN OF CARE
Pt BP becoming labile with SBP in the 70~90s, I called Dr Darvin Ferraro office and gave basic pt info. Dr Diana Horne called back and recommended to continue pt on 2L NS @ 125 cc/hr maintenance while giving 500cc boluses (x3-4) to maintain MAP >65. He recommended to hold off pressors for now. I will reach out to Dr Diana Horne again if needed.  Will continue to keep close eye on pt's VS.      Electronically signed by Amanda Kahn MD on 4/3/2022 at 11:31 PM

## 2022-04-04 NOTE — PROGRESS NOTES
[address] [facility]  Department of Internal Medicine  Internal Medicine Residency Program  Resident MICU Progress  Note      Patient:  Juliette Huggins 73 y.o. male MRN: [id number]     Date of Service: 4/4/2022    Allergy: Patient has no known allergies. Follow up ACS  Subjective     Overnight, patient remained hypotensive, but asymptomatic. NICOM fluid responsive. Started on  cc/h. Patient was seen in the AM. Underwent heart cath today. Tolerated the procedure well. 1 stent was placed in LCX        Objective   Physical Exam:  · Vitals: /67   Pulse (!) 49   Temp 97.7 °F (36.5 °C)   Resp 26   Ht 6' 2\" (1.88 m)   Wt 202 lb 2.6 oz (91.7 kg)   SpO2 91%   BMI 25.96 kg/m²     · I & O - 24hr: In: 2610.4 [P.O.:170; I.V.:1186.7]  · Out: 275 [Urine:275]    Physical Exam  Constitutional:       Appearance: Normal appearance. HENT:      Head: Normocephalic and atraumatic. Mouth/Throat:      Mouth: Mucous membranes are moist.   Cardiovascular:      Rate and Rhythm: Regular rhythm. Bradycardia present. Pulses: Normal pulses. Heart sounds: Normal heart sounds. No murmur heard. Pulmonary:      Effort: Pulmonary effort is normal. No respiratory distress. Breath sounds: Normal breath sounds. No rhonchi. Abdominal:      General: Abdomen is flat. Bowel sounds are normal. There is no distension. Palpations: Abdomen is soft. Tenderness: There is no abdominal tenderness. Musculoskeletal:         General: No swelling or tenderness. Skin:     General: Skin is warm and dry. Neurological:      General: No focal deficit present. Mental Status: He is alert and oriented to person, place, and time.          Pertinent New Labs & Imaging Studies     CBC:   Recent Labs     04/03/22  1120   WBC 12.7*   HGB 13.0   HCT 38.0   MCV 87.6          BMP:    Recent Labs     04/03/22  1120      K 4.2   *   CO2 21*   BUN 21   CREATININE 1.1   GLUCOSE 118*       LIVER PROFILE:   Recent Labs     04/03/22  1120   *   ALT 38   BILITOT 1.6*   ALKPHOS 107       PT/INR:   No results for input(s): PROTIME, INR in the last 72 hours. APTT:   Recent Labs     04/03/22  1720 04/03/22  2245 04/04/22  0657   APTT 38.0* 71.1* 35.9*       Fasting Lipid Panel:    No results found for: CHOL, TRIG, HDL    Cardiac Enzymes:    No results found for: CKTOTAL, CKMB, CKMBINDEX, TROPONINI    Notable Cultures:      Blood cultures No results found for: BC  Respiratory cultures No results found for: RESPCULTURE No results found for: LABGRAM  Urine No results found for: LABURIN  Legionella No results found for: LABLEGI  C Diff PCR No results found for: CDIFPCR  Wound culture/abscess: No results for input(s): WNDABS in the last 72 hours. Tip culture:No results for input(s): CXCATHTIP in the last 72 hours. Imaging studies:  No results found.     Resident's Assessment & Plan     Cardio  NSTEMI   · Presented with 1 day history shortness of breath and diaphoresis  · Troponin I 20, 23 and CK- at [facility]  · Troponin high-sensitivity [id number]>7157  · Repeat EKG showed sinus rhythm with frequent premature PVCs, with no st segment changes   · O2, Nitro SL PRN, Heparin, morphine PRN   · Cardio on board  · S/p Cath, stent placed in LCX  · Start brilenta/ASA        Pulm  Shortness of breath  · Likely 2/2 NSTEMI  · D-dimer elevated initially, repeat <200  · COVID, CXR ordered   · Saturating well on 2L NC        GI  GERD   · On Prilosec at home, continue     Elevated AST   · ALT, ALP wnl; bilirubin slightly elevated 1.6  · Hepatitis panel negative   · Ethanol level <10  · SDS/UDS negative         Renal/   MALLIKA - resolved   · Cr at [facility] 1.4, no baseline   · Likely 2/2 decreased p.o. intake   · Repeat showed cr 1.1   · On 125 cc/h ns         Psych   Anxiety   · On buspar and paxel at home  · Resumed home meds due to increased anxiety         Disposition: continue current management, possible dc in tomorrow if remain stable     Luc Smith MD PGY-1  Attending physician: Dr. Cheryle Mccabe personally saw, examined and provided care for the patient. Radiographs, labs and medication list were reviewed by me independently. I spoke with bedside nursing, therapists and consultants. Critical care services and times documented are independent of procedures and multidisciplinary rounds with Residents. Additionally comprehensive, multidisciplinary rounds were conducted with the MICU team. The case was discussed in detail and plans for care were established. Review of Residents documentation was conducted and revisions were made as appropriate. I agree with the above documented exam, problem list and plan of care.   NSTEMI  S/P heart and stent   ASA and brilinta  Watch in ICU for hours   Hypotension resolved  CXR   niko cai abuse  Narciso Knapp MD,[facility]  Pulmonary&Critical Care Medicine   Director of [address] [place] [address] Director of [address] [place] [address]    Claude Bernabe

## 2022-04-04 NOTE — PROCEDURES
[address]                  [address] [place],  [facility]                            CARDIAC CATHETERIZATION    PATIENT NAME: Macey Horan                    :        1955  MED REC NO:   [id number]                            ROOM:       79  ACCOUNT NO:   [de-identified]                           ADMIT DATE: 2022  PROVIDER:     Miguel A Mace MD    DATE OF PROCEDURE:  2022    LEFT HEART CATHETERIZATION AND PCI    REFERRING PROVIDER:  Daniel Fernandes MD    INDICATION:  Non-ST-elevation myocardial infarction    AUC indication 3 and score 8. PROCEDURE NOTE:  After obtaining a signed consent from the patient, he  was brought to the cardiac cath lab in his usual fasting state. Under  sterile condition and under local anesthetic and using conscious  sedation, a 6-Upper sorbian Terumo Slender sheath was inserted into his right  radial artery. His ACT was 166. He received 5000 units of intravenous  heparin. He also received 200 mcg of intraarterial nitroglycerin and  2.5 mg of diluted verapamil via the right radial sheath. Then, a  5-Upper sorbian TIG diagnostic catheter was advanced under fluoroscopy guidance  over a J-tip wire to the ascending aorta without difficulty. The left  main coronary artery was engaged and a coronary angiogram was done. Then, the right coronary artery was engaged and a coronary angiogram was  done. This catheter was exchanged over a guidewire to a 5-Upper sorbian  pigtail which was advanced into the left ventricle without difficulty. The left ventricular end-diastolic pressure was measured. No left  ventriculogram was done due to known ejection fraction by echocardiogram  done this morning and due to significantly elevated LVEDP. There was no  significant gradient across the aortic valve. FINDINGS:  HEMODYNAMICS:  1. Aortic pressure 90/56 mmHg. 2.  Left ventricular end-diastolic pressure 29 mmHg.     CORONARY ANATOMY:  LEFT MAIN:  It is a large artery with no angiographic stenosis noted. LAD:  It is a large artery wrapping around the apex and giving rise to  two diagonal branches and several septal perforators. There was 40%  discrete ostial LAD stenosis. There was 50% discrete eccentric mid LAD  stenosis. There was 60% to 70% discrete mid LAD stenosis at takeoff of  the second diagonal branch. The LAD was calcified in its proximal to  mid segment. The first diagonal was small. The second diagonal was  fair in size. LEFT CIRCUMFLEX:  It is a large, dominant artery giving rise to a large  first obtuse marginal branch, a small second obtuse marginal branch, a  PDA and a PLV branch or OM branch noted after PCI. There was 100% mid circumflex stenosis with PEG-0 flow and probable clot. RAMUS BRANCH:  It is very small and bifurcating. RCA:  It is small, nondominant artery giving rise to a conus branch, and  an RV marginal branch. CORONARY INTERVENTION NOTE:  The patient received an extra 3000 units of  intravenous heparin. Then, a 6-Mongolian EBU 3.5 guide catheter was used  to engage the left main coronary artery. Then, a Runthrough wire was  advanced into the distal circumflex artery without difficulty. Then,  manual thrombectomy was done using a Penumbra catheter. There was no  restoration of flow distally. Then, a 3.0 x 15 Euphora balloon was  inflated twice at 8 atmospheres at the mid circumflex artery. Then, a  3.5 x 22 Resolute Lorado stent was deployed at 12 atmospheres at the mid  circumflex artery with restoration of PEG-2 to 3 flow to the PDA and  PLV or OM branch. This stent was postdilated using 4.0 x 15 NC Euphora  balloon inflated twice at 12 atmospheres with 0% residual stenosis and  PEG 2-3 flow distally. To mention the patient received two boluses of  Integrilin during the PCI. He also received two tablets of crushed  Brilinta at the end of the PCI.   The patient has been on aspirin. At  the end of the procedure, the patient's ACT was 270. The wire and the  guide catheter were pulled out. The Terumo Slender sheath was pulled  out and a Vasc Band was applied over the right radial artery with good  hemostasis. The patient tolerated the procedure very well and no  complications were noted. No significant blood loss occurred during the  procedure. IMPRESSION:  1.  100% thrombotic occlusion of a large, dominant circumflex artery,  status post PCI using a drug-eluting stent and manual thrombectomy. 2.  60% to 70% bifurcating mid LAD diagonal stenosis. 3.  Elevated LVEDP at 29 mmHg. RECOMMENDATIONS:  1. DAPT. 2.  Aggressive medical therapy. 3.  Cardiac rehab post hospital discharge. 4.  Nuclear stress test four to six weeks after stenting to assess for  ischemia of the LAD/diagonal territory. PROCEDURE START TIME:  09:33 a.m. PROCEDURE END TIME:  10:21 a.m. FLUOROSCOPY TIME:  11.6 minutes. CONTRAST VOLUME:  160 mL of Isovue. CONSCIOUS SEDATION:  1 mg of intravenous Versed and 50 mcg of  intravenous fentanyl.         Cruz Hauser MD    D: 04/04/2022 10:48:29       T: 04/04/2022 10:50:48     [email or web address]  Job#: [id number]     Doc#: [id number]    CC:

## 2022-04-04 NOTE — PROGRESS NOTES
Hospitalist Progress Note      SYNOPSIS: Patient admitted on 4/3/2022 for NSTEMI (non-ST elevated myocardial infarction) [facility]) [facility] complaints of fatigue and shortness of breath. He was doing work outside yesterday shoveling 6000 pounds of gravel and subsequently felt very tired and worn out after that. Overnight he progressively felt more unwell. He admitted to generalized fatigue and some shortness of breath. He admitted to midepigastric discomfort. Admission ECG at [facility] revealed normal sinus rhythm without ST elevation or depressions, but follow-up EKG with new upsloping ST depressions in leads V1 and V2 with PVCs. Decision was made to transfer patient to [facility] for likely need for heart catheterization. S/p heart cath  100% thrombotic occlusion of a large, dominant circumflex artery,  status post PCI using a drug-eluting stent and manual thrombectomy. 60% to 70% bifurcating mid LAD diagonal stenosis. SUBJECTIVE:  Stable overnight. No other overnight issues reported. Patient seen and examined  Records reviewed. S/p heart cath  100% thrombotic occlusion of a large, dominant circumflex artery,  status post PCI using a drug-eluting stent and manual thrombectomy. 60% to 70% bifurcating mid LAD diagonal stenosis. Patient with diaphoresis after heart cath      Temp (24hrs), Av.3 °F (36.8 °C), Min:96.8 °F (36 °C), Max:99.6 °F (37.6 °C)    DIET: ADULT DIET; Regular; Low Fat/Low Chol/High Fiber/2 gm Na  CODE: Full Code    Intake/Output Summary (Last 24 hours) at 2022 1555  Last data filed at 2022 1415  Gross per 24 hour   Intake 3298.02 ml   Output 850 ml   Net 2448.02 ml       Review of Systems  All bolded are positive; please see HPI  General:  Fever, chills, diaphoresis, fatigue, malaise, night sweats, weight loss  Psychological:  Anxiety, disorientation, hallucinations. ENT:  Epistaxis, headaches, vertigo, visual changes.   Cardiovascular:  Chest pain, irregular heartbeats, palpitations, paroxysmal nocturnal dyspnea. Respiratory:  Shortness of breath, coughing, sputum production, hemoptysis, wheezing, orthopnea. Gastrointestinal:  Nausea, vomiting, diarrhea, heartburn, constipation, abdominal pain, hematemesis, hematochezia, melena, acholic stools  Genito-Urinary:  Dysuria, urgency, frequency, hematuria  Musculoskeletal:  Joint pain, joint stiffness, joint swelling, muscle pain  Neurology:  Headache, focal neurological deficits, weakness, numbness, paresthesia  Derm:  Rashes, ulcers, excoriations, bruising  Extremities:  Decreased ROM, peripheral edema, mottling      OBJECTIVE:    /69   Pulse 65   Temp 98 °F (36.7 °C) (Oral)   Resp 21   Ht 6' 2\" (1.88 m)   Wt 202 lb 2.6 oz (91.7 kg)   SpO2 98%   BMI 25.96 kg/m²     General appearance:  awake, alert, and oriented to person, place, time, and purpose; appears stated age and cooperative; no apparent distress no labored breathing  HEENT:  Conjunctivae/corneas clear. Neck: Supple. No jugular venous distention. Respiratory: symmetrical; clear to auscultation bilaterally; no wheezes; no rhonchi; no rales  Cardiovascular: rhythm regular; rate controlled; no murmurs  Abdomen: Soft, nontender, nondistended  Extremities:  peripheral pulses present; no peripheral edema; no ulcers  Musculoskeletal: No clubbing, cyanosis, no bilateral lower extremity edema. Brisk capillary refill. Skin:  No rashes  on visible skin  Neurologic: awake, alert and following commands     ASSESSMENT and PLAN:  · NSTEMI-  Troponin 1,556>2,172>3,613. S/p heart cath 4/4/21 showed 100% thrombotic occlusion of a large, dominant circumflex artery,status post PCI using a drug-eluting stent and manual thrombectomy. 60% to 70% bifurcating mid LAD diagonal stenosis. DAPT/Statin. Cardiology following. Nuclear stress test four to six weeks after stenting to assess for ischemia of the LAD/diagonal territory.   · Hypotension- given BB at dion, continue to monitor   · Elevated LFTs- Hold statin for now  · Leukocytosis  · Marijuana use  · Bradycardia  · GERD  · History of Kidney stones  · Depression and anxiety- paxil/buspar      Medications:  REVIEWED DAILY    Infusion Medications    sodium chloride      sodium chloride       Scheduled Medications    sodium chloride flush  5-40 mL IntraVENous 2 times per day    ticagrelor  90 mg Oral BID    busPIRone  5 mg Oral Daily    PARoxetine  20 mg Oral Daily    aspirin  81 mg Oral Daily    atorvastatin  80 mg Oral Nightly     PRN Meds: sodium chloride flush, sodium chloride, acetaminophen, morphine, nitroGLYCERIN, ondansetron **OR** ondansetron, polyethylene glycol, [DISCONTINUED] acetaminophen **OR** acetaminophen    Labs:     Recent Labs     04/03/22  1120 04/04/22  0827   WBC 12.7* 11.9*   HGB 13.0 12.5   HCT 38.0 37.4    198       Recent Labs     04/03/22 1120 04/04/22  0827    137   K 4.2 4.2   * 106   CO2 21* 20*   BUN 21 21   CREATININE 1.1 1.1   CALCIUM 8.5* 8.1*       Recent Labs     04/03/22 1120 04/04/22  0827   PROT 6.6 6.2*   ALKPHOS 107 102   ALT 38 49*   * 223*   BILITOT 1.6* 2.5*       No results for input(s): INR in the last 72 hours. No results for input(s): Romana Lowell in the last 72 hours. Chronic labs:    No results found for: CHOL, TRIG, HDL, LDLCALC, TSH, PSA, INR, LABA1C    Radiology: REVIEWED DAILY    +++++++++++++++++++++++++++++++++++++++++++++++++  DO ADOLFO Dixon/ [address], [place]  +++++++++++++++++++++++++++++++++++++++++++++++++  NOTE: This report was transcribed using voice recognition software. Every effort was made to ensure accuracy; however, inadvertent computerized transcription errors may be present.

## 2022-04-05 LAB
ALBUMIN SERPL-MCNC: 3.1 G/DL (ref 3.5–5.2)
ALP BLD-CCNC: 92 U/L (ref 40–129)
ALT SERPL-CCNC: 39 U/L (ref 0–40)
ANION GAP SERPL CALCULATED.3IONS-SCNC: 9 MMOL/L (ref 7–16)
AST SERPL-CCNC: 108 U/L (ref 0–39)
BILIRUB SERPL-MCNC: 2.9 MG/DL (ref 0–1.2)
BUN BLDV-MCNC: 16 MG/DL (ref 6–23)
CALCIUM SERPL-MCNC: 8.1 MG/DL (ref 8.6–10.2)
CHLORIDE BLD-SCNC: 107 MMOL/L (ref 98–107)
CO2: 22 MMOL/L (ref 22–29)
CREAT SERPL-MCNC: 1 MG/DL (ref 0.7–1.2)
EKG ATRIAL RATE: 57 BPM
EKG ATRIAL RATE: 63 BPM
EKG P AXIS: 41 DEGREES
EKG P AXIS: 63 DEGREES
EKG P-R INTERVAL: 132 MS
EKG P-R INTERVAL: 152 MS
EKG Q-T INTERVAL: 410 MS
EKG Q-T INTERVAL: 418 MS
EKG QRS DURATION: 74 MS
EKG QRS DURATION: 78 MS
EKG QTC CALCULATION (BAZETT): 406 MS
EKG QTC CALCULATION (BAZETT): 419 MS
EKG R AXIS: 1 DEGREES
EKG R AXIS: 2 DEGREES
EKG T AXIS: -13 DEGREES
EKG T AXIS: 30 DEGREES
EKG VENTRICULAR RATE: 57 BPM
EKG VENTRICULAR RATE: 63 BPM
GFR AFRICAN AMERICAN: >60
GFR NON-AFRICAN AMERICAN: >60 ML/MIN/1.73
GLUCOSE BLD-MCNC: 108 MG/DL (ref 74–99)
HCT VFR BLD CALC: 36.2 % (ref 37–54)
HEMOGLOBIN: 12.7 G/DL (ref 12.5–16.5)
MAGNESIUM: 1.9 MG/DL (ref 1.6–2.6)
MCH RBC QN AUTO: 30.7 PG (ref 26–35)
MCHC RBC AUTO-ENTMCNC: 35.1 % (ref 32–34.5)
MCV RBC AUTO: 87.4 FL (ref 80–99.9)
PDW BLD-RTO: 14.3 FL (ref 11.5–15)
PLATELET # BLD: 185 E9/L (ref 130–450)
PMV BLD AUTO: 10.4 FL (ref 7–12)
POTASSIUM SERPL-SCNC: 4.1 MMOL/L (ref 3.5–5)
RBC # BLD: 4.14 E12/L (ref 3.8–5.8)
SODIUM BLD-SCNC: 138 MMOL/L (ref 132–146)
TOTAL PROTEIN: 5.6 G/DL (ref 6.4–8.3)
WBC # BLD: 12.9 E9/L (ref 4.5–11.5)

## 2022-04-05 PROCEDURE — 6370000000 HC RX 637 (ALT 250 FOR IP): Performed by: INTERNAL MEDICINE

## 2022-04-05 PROCEDURE — 6370000000 HC RX 637 (ALT 250 FOR IP): Performed by: STUDENT IN AN ORGANIZED HEALTH CARE EDUCATION/TRAINING PROGRAM

## 2022-04-05 PROCEDURE — 80053 COMPREHEN METABOLIC PANEL: CPT

## 2022-04-05 PROCEDURE — 99233 SBSQ HOSP IP/OBS HIGH 50: CPT | Performed by: INTERNAL MEDICINE

## 2022-04-05 PROCEDURE — 85027 COMPLETE CBC AUTOMATED: CPT

## 2022-04-05 PROCEDURE — 2580000003 HC RX 258: Performed by: INTERNAL MEDICINE

## 2022-04-05 PROCEDURE — 2140000000 HC CCU INTERMEDIATE R&B

## 2022-04-05 PROCEDURE — 83735 ASSAY OF MAGNESIUM: CPT

## 2022-04-05 RX ORDER — ATORVASTATIN CALCIUM 80 MG/1
80 TABLET, FILM COATED ORAL NIGHTLY
Status: DISCONTINUED | OUTPATIENT
Start: 2022-04-05 | End: 2022-04-06 | Stop reason: HOSPADM

## 2022-04-05 RX ADMIN — TICAGRELOR 90 MG: 90 TABLET ORAL at 08:50

## 2022-04-05 RX ADMIN — PAROXETINE 20 MG: 20 TABLET, FILM COATED ORAL at 08:49

## 2022-04-05 RX ADMIN — ATORVASTATIN CALCIUM 80 MG: 80 TABLET, FILM COATED ORAL at 21:14

## 2022-04-05 RX ADMIN — SODIUM CHLORIDE, PRESERVATIVE FREE 10 ML: 5 INJECTION INTRAVENOUS at 08:50

## 2022-04-05 RX ADMIN — SODIUM CHLORIDE, PRESERVATIVE FREE 10 ML: 5 INJECTION INTRAVENOUS at 21:14

## 2022-04-05 RX ADMIN — TICAGRELOR 90 MG: 90 TABLET ORAL at 21:15

## 2022-04-05 RX ADMIN — ASPIRIN 81 MG 81 MG: 81 TABLET ORAL at 08:50

## 2022-04-05 RX ADMIN — BUSPIRONE HYDROCHLORIDE 5 MG: 5 TABLET ORAL at 08:50

## 2022-04-05 ASSESSMENT — PAIN SCALES - GENERAL
PAINLEVEL_OUTOF10: 0

## 2022-04-05 NOTE — PLAN OF CARE
Problem: Falls - Risk of:  Goal: Will remain free from falls  Description: Will remain free from falls  4/5/2022 1419 by Santiago Jose RN  Outcome: Met This Shift  4/5/2022 0617 by Shola Berman RN  Outcome: Met This Shift  Goal: Absence of physical injury  Description: Absence of physical injury  4/5/2022 1419 by Santiago Jose RN  Outcome: Met This Shift  4/5/2022 0617 by Shola Berman RN  Outcome: Met This Shift     Problem: Bleeding:  Goal: Will show no signs and symptoms of excessive bleeding  Description: Will show no signs and symptoms of excessive bleeding  4/5/2022 1419 by Santiago Jose RN  Outcome: Met This Shift  4/5/2022 0617 by Shola Berman RN  Outcome: Met This Shift     Problem: Gas Exchange - Impaired:  Goal: Levels of oxygenation will improve  Description: Levels of oxygenation will improve  4/5/2022 1419 by Santiago Jose RN  Outcome: Met This Shift  4/5/2022 0617 by Shola Berman RN  Outcome: Met This Shift     Problem: Anxiety:  Goal: Level of anxiety will decrease  Description: Level of anxiety will decrease  4/5/2022 1419 by Santiago Jose RN  Outcome: Met This Shift  4/5/2022 0617 by Shola Berman RN  Outcome: Met This Shift

## 2022-04-05 NOTE — PROGRESS NOTES
[facility]  Department of Internal Medicine   Internal Medicine Residency   MICU Progress Note    Patient:  Sarina Barth 73 y.o. male  MRN: [id number]     Date of Service: 4/5/2022    Allergy: Patient has no known allergies. Cc follow up of DKA     Subjective     Patient is seen and examined this morning. He denies chest pain, dyspnea, orthopnea, PND`s. R radial acces form LHC, non-tender, with good pulses, no numbness, or tingling of RUE, no bleeding, with good capillary refill.  24h change: Npo acute change overnight  ROS: Denies Fever/chills/CP/SOB/N/V/D/C/Dysuria/Blood in stool or urine  Objective     VS: /75   Pulse 71   Temp 99.1 °F (37.3 °C) (Oral)   Resp 20   Ht 6' 2\" (1.88 m)   Wt 202 lb 2.6 oz (91.7 kg)   SpO2 98%   BMI 25.96 kg/m²           I & O - 24hr:   Intake/Output Summary (Last 24 hours) at 4/5/2022 1259  Last data filed at 4/5/2022 1157  Gross per 24 hour   Intake 900 ml   Output 1450 ml   Net -550 ml       Physical Exam:  · General Appearance: alert, appears stated age and cooperative  · Neck: no adenopathy, no carotid bruit, no JVD, supple, symmetrical, trachea midline and thyroid not enlarged, symmetric, no tenderness/mass/nodules  · Lung: clear to auscultation bilaterally  · Heart: regular rate and rhythm, S1, S2 normal, no murmur, click, rub or gallop  · Abdomen: soft, non-tender; bowel sounds normal; no masses,  no organomegaly  · Extremities:  extremities normal, atraumatic, no cyanosis or edema  · Musculoskeletal: No joint swelling, no muscle tenderness. ROM normal in all joints of extremities.    · Neurologic: Mental status: Alert, oriented, thought content appropriate    Lines     site day    Art line   None    TLC None    PICC None    Hemoaccess None    Oxygen:     Room air  Mechanical Ventilation:   N/A  ABG:   No results found for: PHART, PH, NOT3LZF, PCO2, PO2ART, PO2, ZNP2QRD, HCO3, BEART, BE, THGBART, THB, ZTW6PLT, S9XPAQNC, O2SAT Medications     Infusions: (Fluid, Sedation, Vasopressors)  IVF:       N/A  Vasopressors   N/A  Sedation   N/A    Nutrition:   Cardiac diet  ATB:   Antibiotics  Days   None              Skin issues: None  Patient currently has   DVT prophylaxis/ GI prophylaxis: diet  PT/OT    Labs     CBC with Differential:    Lab Results   Component Value Date    WBC 12.9 04/05/2022    RBC 4.14 04/05/2022    HGB 12.7 04/05/2022    HCT 36.2 04/05/2022     04/05/2022    MCV 87.4 04/05/2022    MCH 30.7 04/05/2022    MCHC 35.1 04/05/2022    RDW 14.3 04/05/2022    LYMPHOPCT 11.4 04/03/2022    MONOPCT 8.6 04/03/2022    BASOPCT 0.1 04/03/2022    MONOSABS 1.09 04/03/2022    LYMPHSABS 1.45 04/03/2022    EOSABS 0.00 04/03/2022    BASOSABS 0.01 04/03/2022     CMP:    Lab Results   Component Value Date     04/05/2022    K 4.1 04/05/2022     04/05/2022    CO2 22 04/05/2022    BUN 16 04/05/2022    CREATININE 1.0 04/05/2022    GFRAA >60 04/05/2022    LABGLOM >60 04/05/2022    GLUCOSE 108 04/05/2022    PROT 5.6 04/05/2022    LABALBU 3.1 04/05/2022    CALCIUM 8.1 04/05/2022    BILITOT 2.9 04/05/2022    ALKPHOS 92 04/05/2022     04/05/2022    ALT 39 04/05/2022     Ionized Calcium:  No results found for: IONCA  Magnesium:    Lab Results   Component Value Date    MG 1.9 04/05/2022     Phosphorus:  No results found for: PHOS  PT/INR:  No results found for: PROTIME, INR  PTT:    Lab Results   Component Value Date    APTT 35.9 04/04/2022   [APTT}  Troponin:  No results found for: TROPONINI  U/A:  No results found for: NITRITE, COLORU, PROTEINU, PHUR, LABCAST, WBCUA, RBCUA, MUCUS, TRICHOMONAS, YEAST, BACTERIA, CLARITYU, SPECGRAV, LEUKOCYTESUR, UROBILINOGEN, BILIRUBINUR, BLOODU, GLUCOSEU, AMORPHOUS  HgBA1c:  No results found for: LABA1C    Imaging Studies:    Chest X-ray 4/4/22   Borderline pulmonary vascular congestion suggesting fluid overload and or   elevated left heart filling pressures.          Resident's Assessment and Plan     NSTEMI s/p [facility] with PCI (NATHANIEL to L circumflex) 4/4/22  -trop 3613->2172->1556  -EKG 4/3: SR, inferior infarct, frequent PVC`s  -s/p [facility] 4/4, PCI (NATHANIEL)to L circ; LAD 60-70%  -on DAPT (ASA and ticagrelor), atorvastatin  -cardio following  Plan  -for transfer to telemetry floor  -plan for nuclear stress dashawn in 3-4 weeks after stenting to assess LAD diagonal territory  -Nitro SL prn x chest pin  -for cardiac rehab OP  -lifestyle modifications  -advised on marijuana use cessation    Anxiety/depression  -continue paroxetine and bupoprion    Mohit Contreras MD, PGY-1    Attending physician: Dr. Eve Stack    I personally saw, examined and provided care for the patient. Radiographs, labs and medication list were reviewed by me independently. I spoke with bedside nursing, therapists and consultants. Critical care services and times documented are independent of procedures and multidisciplinary rounds with Residents. Additionally comprehensive, multidisciplinary rounds were conducted with the MICU team. The case was discussed in detail and plans for care were established. Review of Residents documentation was conducted and revisions were made as appropriate. I agree with the above documented exam, problem list and plan of care.     Stable transfer   Observe in step down   No CP  No GI bleed  Narciso Knapp MD,[facility]  Pulmonary&Critical Care Medicine   Director of [address] [place] [address] Director of [address] [place] [address]    Luz Elena Corley

## 2022-04-05 NOTE — PATIENT CARE CONFERENCE
P Quality Flow/Interdisciplinary Rounds Progress Note        Quality Flow Rounds held on April 5, 2022    Disciplines Attending:  , pt/ot, resp therapy, bedside nurse, charge nurse, nursing management    Marcela Clifton was admitted on 4/3/2022  2:22 PM    Anticipated Discharge Date:  Expected Discharge Date: 04/05/22    Disposition:    Vipin Score:  Vipin Scale Score: 20    Readmission Risk              Risk of Unplanned Readmission:  8           Discussed patient goal for the day, patient clinical progression, and barriers to discharge. The following Goal(s) of the Day/Commitment(s) have been identified:  D/c home.   Vini Muller RN  April 5, 2022

## 2022-04-05 NOTE — PROGRESS NOTES
[facility] [place] CARDIOLOGY PROGRESS NOTE  [facility]        Subjective: Non-ST elevation myocardial infarction and had circumflex stent placed yesterday. LVEF preserved around 50%. Uneventful night and no reported chest discomfort chest pain shortness of breath or diaphoresis. He ate okay this morning without nausea vomiting. Objective: Labs chart telemetry and medications all reviewed. Patient Vitals for the past 24 hrs:   BP Temp Temp src Pulse Resp SpO2   04/05/22 1200 109/75 99.1 °F (37.3 °C) Oral 71 20 98 %   04/05/22 1100 121/77 -- -- 65 22 97 %   04/05/22 1000 112/73 -- -- 63 22 97 %   04/05/22 0900 118/81 -- -- 73 20 96 %   04/05/22 0800 115/69 98.4 °F (36.9 °C) Oral 63 19 97 %   04/05/22 0700 109/60 -- -- 70 25 96 %   04/05/22 0600 109/76 -- -- 65 23 93 %   04/05/22 0500 122/70 -- -- 61 17 95 %   04/05/22 0400 108/70 -- -- 58 27 94 %   04/05/22 0300 126/73 -- -- 69 24 94 %   04/05/22 0200 101/64 -- -- 56 15 96 %   04/05/22 0100 97/68 -- -- 72 22 96 %   04/05/22 0000 120/87 99.7 °F (37.6 °C) Oral 78 22 98 %   04/04/22 2300 112/71 -- -- 61 22 95 %   04/04/22 2200 (!) 102/56 -- -- 62 19 95 %   04/04/22 2100 108/66 -- -- 66 19 97 %   04/04/22 2000 (!) 91/52 100.4 °F (38 °C) Oral 69 20 97 %   04/04/22 1900 103/70 -- -- 63 17 98 %   04/04/22 1800 93/65 -- -- 59 19 96 %   04/04/22 1700 117/72 -- -- 78 21 99 %   04/04/22 1600 129/78 98.6 °F (37 °C) Oral 69 20 99 %   04/04/22 1500 107/69 -- -- 65 21 98 %   04/04/22 1400 98/73 -- -- 75 18 98 %         Intake/Output Summary (Last 24 hours) at 4/5/2022 1337  Last data filed at 4/5/2022 1157  Gross per 24 hour   Intake 900 ml   Output 1450 ml   Net -550 ml       Wt Readings from Last 3 Encounters:   04/04/22 202 lb 2.6 oz (91.7 kg)       Telemetry: Personally reviewed and shows normal sinus rhythm heart rate in the 60s.   See plan below  Current meds: Scheduled Meds:   sodium chloride flush  5-40 mL IntraVENous 2 times per day    ticagrelor  90 mg Oral BID    busPIRone  5 mg Oral Daily    PARoxetine  20 mg Oral Daily    aspirin  81 mg Oral Daily    [Held by provider] atorvastatin  80 mg Oral Nightly     Continuous Infusions:   sodium chloride       PRN Meds:.sodium chloride flush, sodium chloride, acetaminophen, morphine, nitroGLYCERIN, ondansetron **OR** ondansetron, polyethylene glycol, [DISCONTINUED] acetaminophen **OR** acetaminophen    Allergies: Patient has no known allergies. Labs:   Recent Labs     04/03/22 1120 04/04/22 0827 04/05/22 0459   WBC 12.7* 11.9* 12.9*   HGB 13.0 12.5 12.7   HCT 38.0 37.4 36.2*   MCV 87.6 90.3 87.4    198 185       Labs:   Recent Labs     04/03/22 1120 04/04/22 0827 04/05/22 0459    137 138   K 4.2 4.2 4.1   * 106 107   CO2 21* 20* 22   BUN 21 21 16   CREATININE 1.1 1.1 1.0       Labs: No results for input(s): CKTOTAL, CKMB, CKMBINDEX, TROPONINI in the last 72 hours. Labs: No results for input(s): BNP in the last 72 hours. Labs: No results for input(s): CHOL, HDL, TRIG in the last 72 hours. Invalid input(s): Odell Wood    Labs:   Recent Labs     04/03/22 1120 04/04/22 0827 04/05/22 0459   PROT 6.6 6.2* 5.6*       Review of systems: No reported significant weight gain or weight loss. no dysuria or frequency, no dizziness, falls or trauma, no change in bowel or bladder habits, no hematochezia, hemoptysis or hematuria. No fevers, chills, nausea or vomiting reported. No significant wheezing or sputum production. No headache or visual changes. The remainder of the 10 review of systems otherwise negative. Exam      General: Patient comfortable in no distress and currently denies any chest pain. HEENT: Face symmetrical and no apparent cranial nerve deficit. Neck: No jugular venous distention, carotid bruit or thyromegaly. Lungs: Clear bilaterally without rales, wheezes or dullness. Cardiac: Regular rate and rhythm, no S3, S4, no rub or gallop. Abdomen: No rebound or guarding, no hepatosplenomegaly. Extremities: Without significant clubbing , cyanosis, or edema. Neuro:  No focal motor or sensory deficit apparent. Skin: No petechiae, no significant bruising. Assessment: See plan below     Plan: #1 non-ST elevation myocardial infarction and underwent heart catheterization yesterday stent to the circumflex large caliber vessel. Medical management. Continue these medications. Plan transfer out of ICU today to telemetry floor and advance activity possibly home tomorrow. #2 bradycardia and avoiding AV blocking agents. No significant bradycardia or arrhythmia on telemetry. #3 premature ventricular contractions and continue to keep potassium 4 or greater magnesium 2 or greater. #4 hypotension improved. Has received appropriate IV fluid. Transfer to telemetry floor today and plan home tomorrow from cardiology viewpoint. Follow-up outpatient [facility] cardiology 1 month.     Electronically signed by Oscar Marinelli MD on 4/5/2022 at 1:37 PM

## 2022-04-05 NOTE — PLAN OF CARE
Problem: Falls - Risk of:  Goal: Will remain free from falls  Description: Will remain free from falls  Outcome: Met This Shift     Problem: Falls - Risk of:  Goal: Absence of physical injury  Description: Absence of physical injury  Outcome: Met This Shift     Problem: Bleeding:  Goal: Will show no signs and symptoms of excessive bleeding  Description: Will show no signs and symptoms of excessive bleeding  Outcome: Met This Shift     Problem: Gas Exchange - Impaired:  Goal: Levels of oxygenation will improve  Description: Levels of oxygenation will improve  Outcome: Met This Shift     Problem: Anxiety:  Goal: Level of anxiety will decrease  Description: Level of anxiety will decrease  Outcome: Met This Shift

## 2022-04-05 NOTE — PROGRESS NOTES
Hospitalist Progress Note      SYNOPSIS: Patient admitted on 4/3/2022 for NSTEMI (non-ST elevated myocardial infarction) [facility]) presented to [place] [address] with complaints of fatigue and shortness of breath.  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 Delayne Skains he progressively felt more unwell.  He admitted to generalized fatigue and some shortness of breath.  He admitted to midepigastric discomfort.  Admission ECG at [facility] revealed normal sinus rhythm without ST elevation or depressions, but follow-up EKG with new upsloping ST depressions in leads V1 and V2 with PVCs.  Decision was made to transfer patient to [facility] likely need for heart catheterization.     S/p heart cath  100% thrombotic occlusion of a large, dominant circumflex artery,  status post PCI using a drug-eluting stent and manual thrombectomy.  60% to 70% bifurcating mid LAD diagonal stenosis. SUBJECTIVE:    Patient seen and examined  Records reviewed. The patient denies any chest pain today. Denies any other complaints today    Stable overnight. No other overnight issues reported. Temp (24hrs), Av.2 °F (37.3 °C), Min:98.4 °F (36.9 °C), Max:100.4 °F (38 °C)    DIET: ADULT DIET; Regular; Low Fat/Low Chol/High Fiber/2 gm Na  CODE: Full Code    Intake/Output Summary (Last 24 hours) at 2022 1550  Last data filed at 2022 1406  Gross per 24 hour   Intake 660 ml   Output 1425 ml   Net -765 ml       OBJECTIVE:    /80   Pulse 69   Temp 99.1 °F (37.3 °C) (Oral)   Resp 26   Ht 6' 2\" (1.88 m)   Wt 202 lb 2.6 oz (91.7 kg)   SpO2 96%   BMI 25.96 kg/m²     General appearance: No apparent distress, appears stated age and cooperative. HEENT:  Conjunctivae/corneas clear. Neck: Supple. No jugular venous distention.    Respiratory: Clear to auscultation bilaterally, normal respiratory effort  Cardiovascular: Regular rate rhythm, normal S1-S2  Abdomen: Soft, nontender, nondistended  Musculoskeletal: No clubbing, cyanosis, no bilateral lower extremity edema. Brisk capillary refill. Skin:  No rashes  on visible skin  Neurologic: awake, alert and following commands     ASSESSMENT and plan:    · NSTEMI-  Troponin 1,556>2,172>3,613. S/p heart cath 4/4/21 showed 100% thrombotic occlusion of a large, dominant circumflex artery,status post PCI using a drug-eluting stent and manual thrombectomy.  60% to 70% bifurcating mid LAD diagonal stenosis. DAPT/Statin. Cardiology following. Nuclear stress test four to six weeks after stenting to assess for ischemia of the LAD/diagonal territory. Continue Brilinta and aspirin  · Hypotension- given BB at [place], continue to monitor   · Elevated LFTs- Hold statin for now  · Leukocytosis  · Marijuana use  · Bradycardia-avoid beta-blockers  · GERD  · History of Kidney stones  · Depression and anxiety- paxil/buspar    Transfer to telemetry today.   Anticipate discharge tomorrow     DISPOSITION:     Medications:  REVIEWED DAILY    Infusion Medications    sodium chloride       Scheduled Medications    sodium chloride flush  5-40 mL IntraVENous 2 times per day    ticagrelor  90 mg Oral BID    busPIRone  5 mg Oral Daily    PARoxetine  20 mg Oral Daily    aspirin  81 mg Oral Daily    [Held by provider] atorvastatin  80 mg Oral Nightly     PRN Meds: sodium chloride flush, sodium chloride, acetaminophen, morphine, nitroGLYCERIN, ondansetron **OR** ondansetron, polyethylene glycol, [DISCONTINUED] acetaminophen **OR** acetaminophen    Labs:     Recent Labs     04/03/22  1120 04/04/22  0827 04/05/22  0459   WBC 12.7* 11.9* 12.9*   HGB 13.0 12.5 12.7   HCT 38.0 37.4 36.2*    198 185       Recent Labs     04/03/22  1120 04/04/22  0827 04/05/22  0459    137 138   K 4.2 4.2 4.1   * 106 107   CO2 21* 20* 22   BUN 21 21 16   CREATININE 1.1 1.1 1.0   CALCIUM 8.5* 8.1* 8.1*       Recent Labs     04/03/22  1120 04/04/22  0827 04/05/22  0459   PROT 6.6 6.2* 5.6*   ALKPHOS 107 102 92   ALT 38 49* 39   * 223* 108*   BILITOT 1.6* 2.5* 2.9*       No results for input(s): INR in the last 72 hours. No results for input(s): [place] in the last 72 hours. Chronic labs:    No results found for: CHOL, TRIG, HDL, LDLCALC, TSH, PSA, INR, LABA1C    Radiology: REVIEWED DAILY    +++++++++++++++++++++++++++++++++++++++++++++++++  Noel Mcginnis MD  [facility] Physician - [address] [place] [address]  +++++++++++++++++++++++++++++++++++++++++++++++++  NOTE: This report was transcribed using voice recognition software. Every effort was made to ensure accuracy; however, inadvertent computerized transcription errors may be present.

## 2022-04-06 VITALS
TEMPERATURE: 97 F | RESPIRATION RATE: 16 BRPM | WEIGHT: 202.16 LBS | BODY MASS INDEX: 25.94 KG/M2 | DIASTOLIC BLOOD PRESSURE: 72 MMHG | SYSTOLIC BLOOD PRESSURE: 118 MMHG | OXYGEN SATURATION: 98 % | HEIGHT: 74 IN | HEART RATE: 67 BPM

## 2022-04-06 LAB
ALBUMIN SERPL-MCNC: 3.1 G/DL (ref 3.5–5.2)
ALP BLD-CCNC: 91 U/L (ref 40–129)
ALT SERPL-CCNC: 29 U/L (ref 0–40)
ANION GAP SERPL CALCULATED.3IONS-SCNC: 9 MMOL/L (ref 7–16)
AST SERPL-CCNC: 45 U/L (ref 0–39)
BILIRUB SERPL-MCNC: 3.2 MG/DL (ref 0–1.2)
BUN BLDV-MCNC: 11 MG/DL (ref 6–23)
CALCIUM SERPL-MCNC: 7.9 MG/DL (ref 8.6–10.2)
CHLORIDE BLD-SCNC: 104 MMOL/L (ref 98–107)
CO2: 22 MMOL/L (ref 22–29)
CREAT SERPL-MCNC: 0.9 MG/DL (ref 0.7–1.2)
GFR AFRICAN AMERICAN: >60
GFR NON-AFRICAN AMERICAN: >60 ML/MIN/1.73
GLUCOSE BLD-MCNC: 107 MG/DL (ref 74–99)
HCT VFR BLD CALC: 36.1 % (ref 37–54)
HEMOGLOBIN: 12.7 G/DL (ref 12.5–16.5)
MCH RBC QN AUTO: 30 PG (ref 26–35)
MCHC RBC AUTO-ENTMCNC: 35.2 % (ref 32–34.5)
MCV RBC AUTO: 85.1 FL (ref 80–99.9)
PDW BLD-RTO: 13.8 FL (ref 11.5–15)
PLATELET # BLD: 200 E9/L (ref 130–450)
PMV BLD AUTO: 10.6 FL (ref 7–12)
POTASSIUM SERPL-SCNC: 3.4 MMOL/L (ref 3.5–5)
RBC # BLD: 4.24 E12/L (ref 3.8–5.8)
SODIUM BLD-SCNC: 135 MMOL/L (ref 132–146)
TOTAL PROTEIN: 5.8 G/DL (ref 6.4–8.3)
WBC # BLD: 10.6 E9/L (ref 4.5–11.5)

## 2022-04-06 PROCEDURE — 2580000003 HC RX 258: Performed by: INTERNAL MEDICINE

## 2022-04-06 PROCEDURE — 6370000000 HC RX 637 (ALT 250 FOR IP): Performed by: INTERNAL MEDICINE

## 2022-04-06 PROCEDURE — 36415 COLL VENOUS BLD VENIPUNCTURE: CPT

## 2022-04-06 PROCEDURE — 85027 COMPLETE CBC AUTOMATED: CPT

## 2022-04-06 PROCEDURE — 80053 COMPREHEN METABOLIC PANEL: CPT

## 2022-04-06 RX ORDER — POTASSIUM CHLORIDE 20 MEQ/1
40 TABLET, EXTENDED RELEASE ORAL ONCE
Status: COMPLETED | OUTPATIENT
Start: 2022-04-06 | End: 2022-04-06

## 2022-04-06 RX ORDER — ATORVASTATIN CALCIUM 80 MG/1
80 TABLET, FILM COATED ORAL NIGHTLY
Qty: 30 TABLET | Refills: 3 | Status: SHIPPED | OUTPATIENT
Start: 2022-04-06

## 2022-04-06 RX ORDER — ASPIRIN 81 MG/1
81 TABLET, CHEWABLE ORAL DAILY
Qty: 30 TABLET | Refills: 1 | Status: SHIPPED | OUTPATIENT
Start: 2022-04-07

## 2022-04-06 RX ADMIN — ASPIRIN 81 MG 81 MG: 81 TABLET ORAL at 09:07

## 2022-04-06 RX ADMIN — PAROXETINE 20 MG: 20 TABLET, FILM COATED ORAL at 09:07

## 2022-04-06 RX ADMIN — BUSPIRONE HYDROCHLORIDE 5 MG: 5 TABLET ORAL at 09:07

## 2022-04-06 RX ADMIN — SODIUM CHLORIDE, PRESERVATIVE FREE 10 ML: 5 INJECTION INTRAVENOUS at 09:07

## 2022-04-06 RX ADMIN — TICAGRELOR 90 MG: 90 TABLET ORAL at 09:07

## 2022-04-06 RX ADMIN — POTASSIUM CHLORIDE 40 MEQ: 20 TABLET, EXTENDED RELEASE ORAL at 09:07

## 2022-04-06 ASSESSMENT — PAIN SCALES - GENERAL
PAINLEVEL_OUTOF10: 0
PAINLEVEL_OUTOF10: 0

## 2022-04-06 NOTE — DISCHARGE SUMMARY
Hospitalist Discharge Summary    Patient ID: Tianna Lubin   Patient : 1955  Patient's PCP: No primary care provider on file. Admit Date: 4/3/2022   Admitting Physician: Karen Perez DO    Discharge Date:  2022   Discharge Physician: Martínez Anderson MD   Discharge Condition: Stable  Discharge Disposition: [facility] course in brief:  (Please refer to daily progress notes for a comprehensive review of the hospitalization by requesting medical records)    Patient admitted on 4/3/2022 for NSTEMI  [facility] and short[facility].  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 Brent Escort he progressively felt more unwell. Jorge L Brooke admitted to generalized fatigue and some shortness of breath.  Admission ECG at [facility] revealed normal sinus rhythm without ST elevation or depressions, but follow-up EKG with new upsloping ST depressions in leads V1 and V2 with PVCs.  Decision was made to transfer patient to [facility] likely need for heart catheterization.     S/p heart cath  100% thrombotic occlusion of a large, dominant circumflex artery,  status post PCI using a drug-eluting stent and manual thrombectomy.  60% to 70% bifurcating mid LAD diagonal stenosis. Patient did well post PCI. Patient was observed in ICU initially and remained stable. Pt was hypotensive and BB has been held. Pt to cont Brillinta and ASA as out pt. Bp has improved and the Pt has been cleared by cardiology for discharge and the pt is stable for discharge home today. Consults:   IP CONSULT TO CARDIAC REHAB    Discharge Diagnoses:    NSTEMI  Bradycardia avoiding beta-blockers  Hypotension improved  Tobacco abuse      Discharge Instructions / Follow up:     Follow up with cardiology in a week  PCP in a week    Continued appropriate risk factor modification of blood pressure, diabetes and serum lipids will remain essential to reducing risk of future atherosclerotic development    Activity: activity as tolerated    Significant labs:  CBC:   Recent Labs     04/04/22  0827 04/05/22  0459 04/06/22  0606   WBC 11.9* 12.9* 10.6   RBC 4.14 4.14 4.24   HGB 12.5 12.7 12.7   HCT 37.4 36.2* 36.1*   MCV 90.3 87.4 85.1   RDW 14.5 14.3 13.8    185 200     BMP:   Recent Labs     04/04/22  0827 04/05/22  0459 04/06/22  0606    138 135   K 4.2 4.1 3.4*    107 104   CO2 20* 22 22   BUN 21 16 11   CREATININE 1.1 1.0 0.9   MG  --  1.9  --      LFT:  Recent Labs     04/04/22  0827 04/05/22  0459 04/06/22  0606   PROT 6.2* 5.6* 5.8*   ALKPHOS 102 92 91   ALT 49* 39 29   * 108* 45*   BILITOT 2.5* 2.9* 3.2*     PT/INR:   Recent Labs     04/03/22  1720 04/03/22  2245 04/04/22  0657   APTT 38.0* 71.1* 35.9*     BNP: No results for input(s): BNP in the last 72 hours.   Hgb A1C: No results found for: LABA1C  Folate and B12: No results found for: UVOWAJBF23, No results found for: FOLATE  Thyroid Studies: No results found for: TSH, L3GJNXG, H4FCIQJ, THYROIDAB    Urinalysis:  No results found for: NITRU, [address], BACTERIA, RBCUA, BLOODU, SPECGRAV, GLUCOSEU    Imaging:  Echo Complete    Result Date: 4/4/2022  Transthoracic Echocardiography Report (TTE)  Demographics   Patient Name       [facility]       Gender               Male                     [address]     [id number]       Room Number          0190  Number   Account #          [de-identified]      Procedure Date       04/04/2022   Corporate ID                      Ordering Physician   Madhav Starkey MD   Accession Number   [id number]     Referring Physician   Date of Birth      1955     Sonographer          Naman Braun RDCS   Age                73 year(s)     Interpreting         Madhav Starkey MD                                    Physician            The [address] [place] [address]                                     Any Other  Procedure Type of Study   TTE procedure  Procedure Date Date: 04/04/2022 Start: 08:44 AM Study Location: Portable Technical Quality: Adequate visualization Indications:Myocardial infarction. Patient Status: Routine Contrast Medium: Definity. Height: 74 inches Weight: 202 pounds BSA: 2.18 m^2 BMI: 25.94 kg/m^2 HR: 54 bpm Allergies   - No known allergies. Findings   Left Ventricle  Ejection fraction is visually estimated at 50-55%. Right Ventricle  Normal right ventricle structure and function. Left Atrium  Normal left atrium. Interatrial septum not well visualized. Right Atrium  Normal right atrium. Mitral Valve  Physiologic and/or trace mitral regurgitation is present. No evidence of mitral valve stenosis. Tricuspid Valve  Mild tricuspid regurgitation. Pulmonary hypertension is mild to moderate . Aortic Valve  Trileaflet aortic valve. Normal leaflet mobility. No aortic stenosis. No aortic regurgitation. Pulmonic Valve  Normal pulmonic valve structure and function. Pericardial Effusion  No evidence for hemodynamically significant pericardial effusion. Pleural Effusion  No evidence of pleural effusion. Aorta  Normal aortic root and ascending aorta. Miscellaneous  The inferior vena cava diameter is normal with normal respiratory  variation. Conclusions   Summary  Physiologic and/or trace mitral regurgitation is present. No evidence of mitral valve stenosis. Mild tricuspid regurgitation. Pulmonary hypertension is mild to moderate . Ejection fraction is visually estimated at 50-55%.    Signature   ----------------------------------------------------------------  Electronically signed by Addison Peng MD(Interpreting  physician) on 04/04/2022 11:16 AM  ----------------------------------------------------------------  M-Mode/2D Measurements & Calculations   LV Diastolic    LV Systolic Dimension: 3.5   AV Cusp Separation: 2.5 cmLA  Dimension: 4.7  cm                           Dimension: 3.6 cmAO Root  cm LV Volume Diastolic: 962.8   Dimension: 3.3 cm  LV FS:25.5 %    ml  LV PW           LV Volume Systolic: 00.2 ml  Diastolic: 1.3  LV EDV/LV EDV Index: 101.4  cm              ml/47 ml/m^2LV ESV/LV ESV    RV Diastolic Dimension: 3.4  LV PW Systolic: Index: 12.5 EN/49PD/ m^2     cm  1.5 cm          EF Calculated: 50.5 %  Septum          LV Mass Index: 109 l/min*m^2 LA/Aorta: 8.73  Diastolic: 1.3  LV Length: 9.3 cm            Ascending Aorta: 4.1 cm  cm                                           LA volume/Index: 29.7 ml  Septum          LVOT: 2.3 cm                 /00.38FE/T^3  Systolic: 1.8                                RA Area: 18.8 cm^2  cm  CO: 5.31 l/min                               IVC Expiration: 2.5 cm  CI: 2.44  l/m*m^2  LV Mass: 237.88  g  Doppler Measurements & Calculations   MV Peak E-Wave:   AV Peak Velocity: 1.44 m/s    LVOT Peak Velocity: 1.07  1.09 m/s          AV Peak Gradient: 8.24 mmHg   m/s  MV Peak A-Wave:   AV Mean Velocity: 0.84 m/s    LVOT Mean Velocity: 0.69  0.69 m/s          AV Mean Gradient: 3.4 mmHg    m/s  MV E/A Ratio:     AV VTI: 25 cm                 LVOT Peak Gradient: 4.6  1.58              AV Area (Continuity):3.94     mmHgLVOT Mean Gradient:  MV Peak Gradient: cm^2                          2.3 mmHg  4.2 mmHg                                        Estimated RVSP: 48.4 mmHg  MV Mean Gradient: LVOT VTI: 23.7 cm             Estimated RAP:15 mmHg  1.4 mmHg          IVRT: 96.9 msec  MV Mean Velocity: Estimated PASP: 48.41 mmHg  0.54 m/s          Pulm. Vein A Reversal         TR Velocity:2.89 m/s  MV Deceleration   Duration:156.9 msec           TR Gradient:33.41 mmHg  Time: 196.5 msec  Pulm. Vein D Velocity:0.45    PV Peak Velocity: 0.96 m/s  MV P1/2t: 77.8    m/sPulm. Vein A Reversal      PV Peak Gradient: 3.66  msec              Velocity:0.37 m/s             mmHg  MVA by PHT:2.83   Pulm.  Vein S Velocity: 0.44   PV Mean Velocity: 0.67 m/s  cm^2              m/s PV Mean Gradient: 2 mmHg  MV Area  (continuity): 2.6  cm^2  MV E' Septal  Velocity: 0.06  m/s  MV E' Lateral  Velocity: 7 m/s  MR Velocity: 3.78  m/s  MR VTI: 124.2 cm  [email or web address]    XR CHEST PORTABLE    Result Date: 4/4/2022  EXAMINATION: ONE XRAY VIEW OF THE CHEST 4/4/2022 11:08 am COMPARISON: None. HISTORY: ORDERING SYSTEM PROVIDED HISTORY: fever TECHNOLOGIST PROVIDED HISTORY: Reason for exam:->fever What reading provider will be dictating this exam?->CRC FINDINGS: Heart size is normal.  Pulmonary vascularity is borderline congested. Lungs are clear. Neither costophrenic angle is blunted. Borderline pulmonary vascular congestion suggesting fluid overload and or elevated left heart filling pressures.        Discharge Medications:      Medication List      START taking these medications    aspirin 81 MG chewable tablet  Take 1 tablet by mouth daily  Start taking on: April 7, 2022     atorvastatin 80 MG tablet  Commonly known as: LIPITOR  Take 1 tablet by mouth nightly     ticagrelor 90 MG Tabs tablet  Commonly known as: BRILINTA  Take 1 tablet by mouth 2 times daily        CONTINUE taking these medications    busPIRone 5 MG tablet  Commonly known as: BUSPAR     omeprazole 20 MG delayed release capsule  Commonly known as: PRILOSEC     PARoxetine 20 MG tablet  Commonly known as: PAXIL     tamsulosin 0.4 MG capsule  Commonly known as: FLOMAX           Where to Get Your Medications      These medications were sent to [facility] [place] [address]    Phone: [phone]   · aspirin 81 MG chewable tablet  · atorvastatin 80 MG tablet  · ticagrelor 90 MG Tabs tablet         Time Spent on discharge is more than 45 minutes in the examination, evaluation, counseling and review of medications and discharge plan.    +++++++++++++++++++++++++++++++++++++++++++++++++  Payal Diaz MD  [facility] Physician - [address] [place] [address]  +++++++++++++++++++++++++++++++++++++++++++++++++  NOTE: This report was transcribed using voice recognition software. Every effort was made to ensure accuracy; however, inadvertent computerized transcription errors may be present.

## 2022-04-06 NOTE — PROGRESS NOTES
[facility] CARDIOLOGY PROGRESS NOTE  [facility]        Subjective:  Seeing patient for follow-up as he presented at [facility] emergency room with non-ST elevation microinfarction, vague chest symptoms, smokes marijuana several times a day. Echocardiogram this admission LVEF 50%. Underwent heart catheterization 2 days ago and showed circumflex to be occluded with clot. Ambulating without shortness of breath chest pain or distress. Objective: Medications lab chart and telemetry all reviewed. Patient Vitals for the past 24 hrs:   BP Temp Temp src Pulse Resp SpO2   04/06/22 0800 101/67 97.9 °F (36.6 °C) Oral 63 16 97 %   04/06/22 0433 96/64 97.7 °F (36.5 °C) Temporal 66 16 99 %   04/05/22 1925 134/77 98.8 °F (37.1 °C) Temporal 78 16 97 %   04/05/22 1800 109/66 -- -- 77 (!) 33 --   04/05/22 1700 105/62 -- -- 73 18 --   04/05/22 1600 120/85 100 °F (37.8 °C) Oral 78 17 97 %   04/05/22 1500 -- -- -- 73 18 96 %   04/05/22 1400 133/80 -- -- 69 26 96 %         Intake/Output Summary (Last 24 hours) at 4/6/2022 1305  Last data filed at 4/6/2022 1018  Gross per 24 hour   Intake 840 ml   Output 475 ml   Net 365 ml       Wt Readings from Last 3 Encounters:   04/04/22 202 lb 2.6 oz (91.7 kg)       Telemetry: Personally reviewed and shows normal sinus rhythm heart rate in the 80s. Current meds: Scheduled Meds:   atorvastatin  80 mg Oral Nightly    sodium chloride flush  5-40 mL IntraVENous 2 times per day    ticagrelor  90 mg Oral BID    busPIRone  5 mg Oral Daily    PARoxetine  20 mg Oral Daily    aspirin  81 mg Oral Daily     Continuous Infusions:   sodium chloride       PRN Meds:.sodium chloride flush, sodium chloride, acetaminophen, morphine, nitroGLYCERIN, ondansetron **OR** ondansetron, polyethylene glycol, [DISCONTINUED] acetaminophen **OR** acetaminophen    Allergies: Patient has no known allergies.       Labs:   Recent Labs     04/04/22  0827 04/05/22  0459 04/06/22  0606   WBC 11.9* 12.9* 10.6 atorvastatin 80 mg daily. #2 bradycardia and avoiding AV blocking agent and heart rate still at times in the 50s. #3 hypotension now resolved and improved. #4 normal LVEF 50% on echocardiogram this admission. #5 smoking cessation education provided and reviewed with the patient and his wife. Patient can be discharged to home this afternoon from cardiology viewpoint and be seen back at [facility] cardiology in 1 month.   Continue current medical regimen        Electronically signed by Leonidas Vega MD on 4/6/2022 at 1:05 PM

## 2022-04-06 NOTE — PLAN OF CARE
Problem: Falls - Risk of:  Goal: Will remain free from falls  Description: Will remain free from falls  4/6/2022 0317 by Ollie Johnson RN  Outcome: Met This Shift  4/5/2022 1419 by Glenn Ren RN  Outcome: Met This Shift  Goal: Absence of physical injury  Description: Absence of physical injury  4/6/2022 0317 by Ollie Johnson RN  Outcome: Met This Shift  4/5/2022 1419 by Glenn Ren RN  Outcome: Met This Shift     Problem: Bleeding:  Goal: Will show no signs and symptoms of excessive bleeding  Description: Will show no signs and symptoms of excessive bleeding  4/6/2022 0317 by Ollie Johnson RN  Outcome: Met This Shift  4/5/2022 1419 by Glenn Ren RN  Outcome: Met This Shift     Problem: Gas Exchange - Impaired:  Goal: Levels of oxygenation will improve  Description: Levels of oxygenation will improve  4/6/2022 0317 by Ollie Johnson RN  Outcome: Met This Shift  4/5/2022 1419 by Glenn Ren RN  Outcome: Met This Shift     Problem: Anxiety:  Goal: Level of anxiety will decrease  Description: Level of anxiety will decrease  4/6/2022 0317 by Ollie Johnson RN  Outcome: Met This Shift  4/5/2022 1419 by Glenn Ren RN  Outcome: Met This Shift    Pt. Utilizing call light appropriately. No bleeding during shift, educated on Brilinta importance of taking and to monitor for S&S of bleeding. No SOB or CP. Denies anxiety. Slept well throughout the night.  VS WNL

## 2022-04-06 NOTE — PROGRESS NOTES
CLINICAL PHARMACY NOTE: MEDS TO BEDS    Total # of Prescriptions Filled: 3   The following medications were delivered to the patient:  · Atorvastatin 80  · Aspirin 81  · brilinta 90    Additional Documentation:

## 2022-04-17 ENCOUNTER — APPOINTMENT (OUTPATIENT)
Dept: GENERAL RADIOLOGY | Age: 67
End: 2022-04-17
Payer: MEDICARE

## 2022-04-17 ENCOUNTER — APPOINTMENT (OUTPATIENT)
Dept: ULTRASOUND IMAGING | Age: 67
End: 2022-04-17
Payer: MEDICARE

## 2022-04-17 ENCOUNTER — APPOINTMENT (OUTPATIENT)
Dept: CT IMAGING | Age: 67
End: 2022-04-17
Payer: MEDICARE

## 2022-04-17 ENCOUNTER — HOSPITAL ENCOUNTER (OUTPATIENT)
Age: 67
Setting detail: OBSERVATION
Discharge: HOME | End: 2022-04-19
Attending: EMERGENCY MEDICINE | Admitting: INTERNAL MEDICINE
Payer: MEDICARE

## 2022-04-17 DIAGNOSIS — K44.9 HIATAL HERNIA: ICD-10-CM

## 2022-04-17 DIAGNOSIS — R55 SYNCOPE AND COLLAPSE: Primary | ICD-10-CM

## 2022-04-17 LAB
ALBUMIN SERPL-MCNC: 3.8 G/DL (ref 3.5–5.2)
ALP BLD-CCNC: 153 U/L (ref 40–129)
ALT SERPL-CCNC: 39 U/L (ref 0–40)
ANION GAP SERPL CALCULATED.3IONS-SCNC: 12 MMOL/L (ref 7–16)
AST SERPL-CCNC: 24 U/L (ref 0–39)
BACTERIA: NORMAL /HPF
BASOPHILS ABSOLUTE: 0.07 E9/L (ref 0–0.2)
BASOPHILS RELATIVE PERCENT: 1 % (ref 0–2)
BILIRUB SERPL-MCNC: 0.9 MG/DL (ref 0–1.2)
BILIRUBIN URINE: NEGATIVE
BLOOD, URINE: NEGATIVE
BUN BLDV-MCNC: 15 MG/DL (ref 6–23)
CALCIUM SERPL-MCNC: 9 MG/DL (ref 8.6–10.2)
CHLORIDE BLD-SCNC: 103 MMOL/L (ref 98–107)
CLARITY: CLEAR
CO2: 26 MMOL/L (ref 22–29)
COLOR: YELLOW
CREAT SERPL-MCNC: 1.2 MG/DL (ref 0.7–1.2)
D DIMER: 784 NG/ML DDU
EOSINOPHILS ABSOLUTE: 0.18 E9/L (ref 0.05–0.5)
EOSINOPHILS RELATIVE PERCENT: 2.5 % (ref 0–6)
GFR AFRICAN AMERICAN: >60
GFR NON-AFRICAN AMERICAN: >60 ML/MIN/1.73
GLUCOSE BLD-MCNC: 124 MG/DL (ref 74–99)
GLUCOSE URINE: NEGATIVE MG/DL
HCT VFR BLD CALC: 42.2 % (ref 37–54)
HEMOGLOBIN: 14.3 G/DL (ref 12.5–16.5)
IMMATURE GRANULOCYTES #: 0.02 E9/L
IMMATURE GRANULOCYTES %: 0.3 % (ref 0–5)
KETONES, URINE: NEGATIVE MG/DL
LEUKOCYTE ESTERASE, URINE: NEGATIVE
LYMPHOCYTES ABSOLUTE: 2 E9/L (ref 1.5–4)
LYMPHOCYTES RELATIVE PERCENT: 28.1 % (ref 20–42)
MCH RBC QN AUTO: 29.3 PG (ref 26–35)
MCHC RBC AUTO-ENTMCNC: 33.9 % (ref 32–34.5)
MCV RBC AUTO: 86.5 FL (ref 80–99.9)
MONOCYTES ABSOLUTE: 0.59 E9/L (ref 0.1–0.95)
MONOCYTES RELATIVE PERCENT: 8.3 % (ref 2–12)
NEUTROPHILS ABSOLUTE: 4.26 E9/L (ref 1.8–7.3)
NEUTROPHILS RELATIVE PERCENT: 59.8 % (ref 43–80)
NITRITE, URINE: NEGATIVE
PDW BLD-RTO: 13.5 FL (ref 11.5–15)
PH UA: 6 (ref 5–9)
PLATELET # BLD: 388 E9/L (ref 130–450)
PMV BLD AUTO: 10.1 FL (ref 7–12)
POTASSIUM REFLEX MAGNESIUM: 4.6 MMOL/L (ref 3.5–5)
PRO-BNP: 1587 PG/ML (ref 0–125)
PROTEIN UA: NEGATIVE MG/DL
RBC # BLD: 4.88 E12/L (ref 3.8–5.8)
RBC UA: NORMAL /HPF (ref 0–2)
SODIUM BLD-SCNC: 141 MMOL/L (ref 132–146)
SPECIFIC GRAVITY UA: 1.02 (ref 1–1.03)
TOTAL PROTEIN: 7.4 G/DL (ref 6.4–8.3)
TROPONIN, HIGH SENSITIVITY: 74 NG/L (ref 0–11)
TROPONIN, HIGH SENSITIVITY: 75 NG/L (ref 0–11)
UROBILINOGEN, URINE: 0.2 E.U./DL
WBC # BLD: 7.1 E9/L (ref 4.5–11.5)
WBC UA: NORMAL /HPF (ref 0–5)

## 2022-04-17 PROCEDURE — 99283 EMERGENCY DEPT VISIT LOW MDM: CPT

## 2022-04-17 PROCEDURE — 2580000003 HC RX 258: Performed by: STUDENT IN AN ORGANIZED HEALTH CARE EDUCATION/TRAINING PROGRAM

## 2022-04-17 PROCEDURE — 71275 CT ANGIOGRAPHY CHEST: CPT

## 2022-04-17 PROCEDURE — 93005 ELECTROCARDIOGRAM TRACING: CPT | Performed by: STUDENT IN AN ORGANIZED HEALTH CARE EDUCATION/TRAINING PROGRAM

## 2022-04-17 PROCEDURE — 84484 ASSAY OF TROPONIN QUANT: CPT

## 2022-04-17 PROCEDURE — G0378 HOSPITAL OBSERVATION PER HR: HCPCS

## 2022-04-17 PROCEDURE — 96372 THER/PROPH/DIAG INJ SC/IM: CPT

## 2022-04-17 PROCEDURE — 93880 EXTRACRANIAL BILAT STUDY: CPT

## 2022-04-17 PROCEDURE — 71045 X-RAY EXAM CHEST 1 VIEW: CPT

## 2022-04-17 PROCEDURE — 2580000003 HC RX 258: Performed by: INTERNAL MEDICINE

## 2022-04-17 PROCEDURE — 85378 FIBRIN DEGRADE SEMIQUANT: CPT

## 2022-04-17 PROCEDURE — 80053 COMPREHEN METABOLIC PANEL: CPT

## 2022-04-17 PROCEDURE — 81001 URINALYSIS AUTO W/SCOPE: CPT

## 2022-04-17 PROCEDURE — 6370000000 HC RX 637 (ALT 250 FOR IP): Performed by: INTERNAL MEDICINE

## 2022-04-17 PROCEDURE — 83880 ASSAY OF NATRIURETIC PEPTIDE: CPT

## 2022-04-17 PROCEDURE — 6360000004 HC RX CONTRAST MEDICATION: Performed by: RADIOLOGY

## 2022-04-17 PROCEDURE — 85025 COMPLETE CBC W/AUTO DIFF WBC: CPT

## 2022-04-17 PROCEDURE — 6360000002 HC RX W HCPCS: Performed by: INTERNAL MEDICINE

## 2022-04-17 RX ORDER — ONDANSETRON 4 MG/1
4 TABLET, ORALLY DISINTEGRATING ORAL EVERY 8 HOURS PRN
Status: DISCONTINUED | OUTPATIENT
Start: 2022-04-17 | End: 2022-04-19 | Stop reason: HOSPADM

## 2022-04-17 RX ORDER — POLYETHYLENE GLYCOL 3350 17 G/17G
17 POWDER, FOR SOLUTION ORAL DAILY PRN
Status: DISCONTINUED | OUTPATIENT
Start: 2022-04-17 | End: 2022-04-19 | Stop reason: HOSPADM

## 2022-04-17 RX ORDER — ACETAMINOPHEN 650 MG/1
650 SUPPOSITORY RECTAL EVERY 6 HOURS PRN
Status: DISCONTINUED | OUTPATIENT
Start: 2022-04-17 | End: 2022-04-19 | Stop reason: HOSPADM

## 2022-04-17 RX ORDER — BUSPIRONE HYDROCHLORIDE 5 MG/1
5 TABLET ORAL DAILY
Status: DISCONTINUED | OUTPATIENT
Start: 2022-04-17 | End: 2022-04-19 | Stop reason: HOSPADM

## 2022-04-17 RX ORDER — SODIUM CHLORIDE 0.9 % (FLUSH) 0.9 %
5-40 SYRINGE (ML) INJECTION EVERY 12 HOURS SCHEDULED
Status: DISCONTINUED | OUTPATIENT
Start: 2022-04-17 | End: 2022-04-19 | Stop reason: HOSPADM

## 2022-04-17 RX ORDER — ACETAMINOPHEN 325 MG/1
650 TABLET ORAL EVERY 6 HOURS PRN
Status: DISCONTINUED | OUTPATIENT
Start: 2022-04-17 | End: 2022-04-19 | Stop reason: HOSPADM

## 2022-04-17 RX ORDER — TAMSULOSIN HYDROCHLORIDE 0.4 MG/1
0.4 CAPSULE ORAL DAILY
Status: DISCONTINUED | OUTPATIENT
Start: 2022-04-17 | End: 2022-04-18

## 2022-04-17 RX ORDER — SODIUM CHLORIDE 9 MG/ML
INJECTION, SOLUTION INTRAVENOUS PRN
Status: DISCONTINUED | OUTPATIENT
Start: 2022-04-17 | End: 2022-04-19 | Stop reason: HOSPADM

## 2022-04-17 RX ORDER — SODIUM CHLORIDE 0.9 % (FLUSH) 0.9 %
5-40 SYRINGE (ML) INJECTION PRN
Status: DISCONTINUED | OUTPATIENT
Start: 2022-04-17 | End: 2022-04-19 | Stop reason: HOSPADM

## 2022-04-17 RX ORDER — PANTOPRAZOLE SODIUM 40 MG/1
40 TABLET, DELAYED RELEASE ORAL
Status: DISCONTINUED | OUTPATIENT
Start: 2022-04-18 | End: 2022-04-19 | Stop reason: HOSPADM

## 2022-04-17 RX ORDER — ONDANSETRON 2 MG/ML
4 INJECTION INTRAMUSCULAR; INTRAVENOUS EVERY 6 HOURS PRN
Status: DISCONTINUED | OUTPATIENT
Start: 2022-04-17 | End: 2022-04-19 | Stop reason: HOSPADM

## 2022-04-17 RX ORDER — 0.9 % SODIUM CHLORIDE 0.9 %
1000 INTRAVENOUS SOLUTION INTRAVENOUS ONCE
Status: COMPLETED | OUTPATIENT
Start: 2022-04-17 | End: 2022-04-17

## 2022-04-17 RX ORDER — ASPIRIN 81 MG/1
81 TABLET, CHEWABLE ORAL DAILY
Status: DISCONTINUED | OUTPATIENT
Start: 2022-04-17 | End: 2022-04-19 | Stop reason: HOSPADM

## 2022-04-17 RX ORDER — PAROXETINE HYDROCHLORIDE 20 MG/1
20 TABLET, FILM COATED ORAL EVERY MORNING
Status: DISCONTINUED | OUTPATIENT
Start: 2022-04-18 | End: 2022-04-19 | Stop reason: HOSPADM

## 2022-04-17 RX ORDER — ATORVASTATIN CALCIUM 40 MG/1
80 TABLET, FILM COATED ORAL NIGHTLY
Status: DISCONTINUED | OUTPATIENT
Start: 2022-04-17 | End: 2022-04-19 | Stop reason: HOSPADM

## 2022-04-17 RX ADMIN — SODIUM CHLORIDE 1000 ML: 9 INJECTION, SOLUTION INTRAVENOUS at 16:20

## 2022-04-17 RX ADMIN — Medication 10 ML: at 20:56

## 2022-04-17 RX ADMIN — ATORVASTATIN CALCIUM 80 MG: 40 TABLET, FILM COATED ORAL at 20:55

## 2022-04-17 RX ADMIN — IOPAMIDOL 75 ML: 755 INJECTION, SOLUTION INTRAVENOUS at 17:09

## 2022-04-17 RX ADMIN — BUSPIRONE HYDROCHLORIDE 5 MG: 5 TABLET ORAL at 20:55

## 2022-04-17 RX ADMIN — ENOXAPARIN SODIUM 40 MG: 100 INJECTION SUBCUTANEOUS at 20:55

## 2022-04-17 RX ADMIN — ASPIRIN 81 MG 81 MG: 81 TABLET ORAL at 20:55

## 2022-04-17 RX ADMIN — TAMSULOSIN HYDROCHLORIDE 0.4 MG: 0.4 CAPSULE ORAL at 20:56

## 2022-04-17 RX ADMIN — TICAGRELOR 90 MG: 90 TABLET ORAL at 21:48

## 2022-04-17 ASSESSMENT — ENCOUNTER SYMPTOMS
NAUSEA: 0
SINUS PRESSURE: 0
SHORTNESS OF BREATH: 0
VOMITING: 0
ABDOMINAL DISTENTION: 0
RHINORRHEA: 0
CONSTIPATION: 0
ANAL BLEEDING: 0
EYE DISCHARGE: 0
CHEST TIGHTNESS: 0
ABDOMINAL PAIN: 0
DIARRHEA: 0
COUGH: 0
BACK PAIN: 0
SINUS PAIN: 0

## 2022-04-17 ASSESSMENT — PAIN SCALES - GENERAL: PAINLEVEL_OUTOF10: 0

## 2022-04-17 NOTE — ED NOTES
N2N to [facility]. Patient placed in transport queue.        Emmanuel Hawthorne RN  04/17/22 6370

## 2022-04-17 NOTE — ED PROVIDER NOTES
HENT:      Head: Normocephalic and atraumatic. Eyes:      Conjunctiva/sclera: Conjunctivae normal.   Cardiovascular:      Rate and Rhythm: Normal rate and regular rhythm. Heart sounds: Normal heart sounds. No murmur heard. Pulmonary:      Effort: Pulmonary effort is normal. No respiratory distress. Breath sounds: Normal breath sounds. No wheezing or rales. Abdominal:      General: Bowel sounds are normal.      Palpations: Abdomen is soft. Tenderness: There is no abdominal tenderness. There is no guarding or rebound. Musculoskeletal:         General: No tenderness or deformity. Cervical back: Normal range of motion and neck supple. Skin:     General: Skin is warm and dry. Neurological:      Mental Status: He is alert and oriented to person, place, and time. Cranial Nerves: No cranial nerve deficit. Coordination: Coordination normal.          Procedures     MDM     ED Course as of 04/17/22 1520   Sun Apr 17, 2022   1457 EKG read interpreted read and interpreted by me. Rate 62 bpm. Left axis. Qrs concerning for previous infarct noted on previous EKG. Compared to prior ekg with nol acute changes. [JM]      ED Course User Index  [SEUN] Gris Ramesh MD      Patient is a 73 y.o. male presenting with syncope. Patient was noted to have a stent done on 4/4. Patient had 3 episodes of syncope today. Patient is denying any chest pain or shortness of breath. Patient had D-dimer drawn. D-dimer is elevated. Patient had a CTA that indicated a hiatal hernia but no signs of PE or aortic dissection though this test is specified for pulmonary embolism. Patient had a cardiac work-up. Patient's EKG is at baseline compared to prior. Patient had an elevated troponin. Patient had a repeat troponin drawn. Given patient's recent stent and multiple episodes of syncope today he will be admitted to internal medicine. Discussed this with internal medicine.        Patient was seen and evaluated by myself and my attending Flaco Martin MD. Assessment and Plan discussed with attending provider, please see attestation for final plan of care. This note was done using dictation software and there may be some grammatical errors associated with this. Radha Baez MD       ED Course as of 04/17/22 4790   Sun Apr 17, 2022   9817 EKG read interpreted read and interpreted by me. Rate 62 bpm. Left axis. Qrs concerning for previous infarct noted on previous EKG. Compared to prior ekg with nol acute changes. [JM]      ED Course User Index  [JM] Radha Baez MD       --------------------------------------------- PAST HISTORY ---------------------------------------------  Past Medical History:  has a past medical history of CAD (coronary artery disease) and Kidney stones. Past Surgical History:  has a past surgical history that includes Kidney stone surgery and hx vascular stent (04/2022). Social History:  reports that he has been smoking. He has smoked for the past 50.00 years. He has never used smokeless tobacco. He reports previous alcohol use. He reports current drug use. Drug: Marijuana Nery Shunk). Family History: family history includes Diabetes in his father. The patients home medications have been reviewed. Allergies: Patient has no known allergies.     -------------------------------------------------- RESULTS -------------------------------------------------    LABS:  Results for orders placed or performed during the hospital encounter of 04/17/22   CBC with Auto Differential   Result Value Ref Range    WBC 7.1 4.5 - 11.5 E9/L    RBC 4.88 3.80 - 5.80 E12/L    Hemoglobin 14.3 12.5 - 16.5 g/dL    Hematocrit 42.2 37.0 - 54.0 %    MCV 86.5 80.0 - 99.9 fL    MCH 29.3 26.0 - 35.0 pg    MCHC 33.9 32.0 - 34.5 %    RDW 13.5 11.5 - 15.0 fL    Platelets 785 248 - 028 E9/L    MPV 10.1 7.0 - 12.0 fL    Neutrophils % 59.8 43.0 - 80.0 %    Immature Granulocytes % 0.3 0.0 - 5.0 %    Lymphocytes % 28.1 20.0 - 42.0 %    Monocytes % 8.3 2.0 - 12.0 %    Eosinophils % 2.5 0.0 - 6.0 %    Basophils % 1.0 0.0 - 2.0 %    Neutrophils Absolute 4.26 1.80 - 7.30 E9/L    Immature Granulocytes # 0.02 E9/L    Lymphocytes Absolute 2.00 1.50 - 4.00 E9/L    Monocytes Absolute 0.59 0.10 - 0.95 E9/L    Eosinophils Absolute 0.18 0.05 - 0.50 E9/L    Basophils Absolute 0.07 0.00 - 0.20 E9/L   Comprehensive Metabolic Panel w/ Reflex to MG   Result Value Ref Range    Sodium 141 132 - 146 mmol/L    Potassium reflex Magnesium 4.6 3.5 - 5.0 mmol/L    Chloride 103 98 - 107 mmol/L    CO2 26 22 - 29 mmol/L    Anion Gap 12 7 - 16 mmol/L    Glucose 124 (H) 74 - 99 mg/dL    BUN 15 6 - 23 mg/dL    CREATININE 1.2 0.7 - 1.2 mg/dL    GFR Non-African American >60 >=60 mL/min/1.73    GFR African American >60     Calcium 9.0 8.6 - 10.2 mg/dL    Total Protein 7.4 6.4 - 8.3 g/dL    Albumin 3.8 3.5 - 5.2 g/dL    Total Bilirubin 0.9 0.0 - 1.2 mg/dL    Alkaline Phosphatase 153 (H) 40 - 129 U/L    ALT 39 0 - 40 U/L    AST 24 0 - 39 U/L   Brain Natriuretic Peptide   Result Value Ref Range    Pro-BNP 1,587 (H) 0 - 125 pg/mL   D-Dimer, Quantitative   Result Value Ref Range    D-Dimer, Quant 784 ng/mL DDU   Troponin   Result Value Ref Range    Troponin, High Sensitivity 75 (H) 0 - 11 ng/L   Urinalysis with Microscopic   Result Value Ref Range    Color, UA Yellow Straw/Yellow    Clarity, UA Clear Clear    Glucose, Ur Negative Negative mg/dL    Bilirubin Urine Negative Negative    Ketones, Urine Negative Negative mg/dL    Specific Gravity, UA 1.025 1.005 - 1.030    Blood, Urine Negative Negative    pH, UA 6.0 5.0 - 9.0    Protein, UA Negative Negative mg/dL    Urobilinogen, Urine 0.2 <2.0 E.U./dL    Nitrite, Urine Negative Negative    Leukocyte Esterase, Urine Negative Negative    WBC, UA 0-1 0 - 5 /HPF    RBC, UA NONE 0 - 2 /HPF    Bacteria, UA NONE SEEN None Seen /HPF       RADIOLOGY:  CTA PULMONARY W CONTRAST   Final Result   No evidence of pulmonary embolism or acute pulmonary abnormality. Hiatal hernia containing part of the stomach. 2 mm nonobstructing stone of the midpole of the left kidney. Moderate amount of calcification coronary arteries. XR CHEST PORTABLE   Final Result   No acute process. ------------------------- NURSING NOTES AND VITALS REVIEWED ---------------------------  Date / Time Roomed:  4/17/2022  2:43 PM  ED Bed Assignment:  65/63    The nursing notes within the ED encounter and vital signs as below have been reviewed. Patient Vitals for the past 24 hrs:   BP Temp Temp src Pulse Resp SpO2 Height Weight   04/17/22 1440 122/79 -- -- -- (!) 6 98 % 6' 2\" (1.88 m) 190 lb (86.2 kg)   04/17/22 1437 -- 97.6 °F (36.4 °C) Oral 73 -- 97 % -- --       Oxygen Saturation Interpretation: Normal    ------------------------------------------ PROGRESS NOTES ------------------------------------------  See ED course for reevaluation    Counseling:  I have spoken with the patient and discussed todays results, in addition to providing specific details for the plan of care and counseling regarding the diagnosis and prognosis. Their questions are answered at this time and they are agreeable with the plan of admission.    --------------------------------- ADDITIONAL PROVIDER NOTES ---------------------------------  Consultations:   Spoke with Dr. Falguni Chow. Discussed case. They will admit the patient. This patient's ED course included: a personal history and physicial examination and re-evaluation prior to disposition    This patient has remained hemodynamically stable during their ED course. Diagnosis:  1. Syncope and collapse    2.  Hiatal hernia        Disposition:  Patient's disposition: Admit to telemetry  Patient's condition is Stable         Sarina Zuniga MD  Resident  04/17/22 8693

## 2022-04-17 NOTE — H&P
Hospitalist History & Physical      PCP: No primary care provider on file. Date of Admission: 4/17/2022    Date of Service: Pt seen/examined on 4/17/2022 and is    Placed in Observation. Chief Complaint:  had concerns including Loss of Consciousness (3 times today, wife stated he did not fall she assisted him onto couch, had stent palced 4/6/22) and Nausea (1/2 hour ago). History Of Present Illness:    Mr. Marquise Jackman, a 73y.o. year old male  who  has a past medical history of CAD (coronary artery disease) and Kidney stones. A 73-year-old male who was recently discharged from our hospital after a 3-day stay for NSTEMI that required heart cath on 4/4/2022 status post PCI and manual thrombectomy. Patient was discharged home with aspirin, Brilinta, statins but no beta-blockers because of bradycardia. He states that he has been doing well until the evening of 4/16 when he had an episode of lightheadedness when he stood up from a seated position. He had 3 similar episodes in the afternoon on 4/16, again feeling very lightheaded and even passed out when he tried to stand up from sitting position. He denies any chest pain, nausea, vomiting, headache, double vision, shortness of breath. In the ER, he had unremarkable vitals and labs. He was admitted for observation for syncope. I have seen and examined this patient in the ER. Patient is alert awake oriented x3 and providing most of the information. Past Medical History:   Diagnosis Date    CAD (coronary artery disease)     kat 3.5x22 ivonne cx 4-4-22.  Kidney stones        Past Surgical History:   Procedure Laterality Date    HX VASCULAR STENT  04/2022    KIDNEY STONE SURGERY      2010       Prior to Admission medications    Medication Sig Start Date End Date Taking?  Authorizing Provider   aspirin 81 MG chewable tablet Take 1 tablet by mouth daily 4/7/22   Kerry Soares MD   atorvastatin (LIPITOR) 80 MG tablet Take 1 tablet by mouth nightly 4/6/22   Reinaldo Quezada MD   ticagrelor (BRILINTA) 90 MG TABS tablet Take 1 tablet by mouth 2 times daily 4/6/22   Reinaldo Quezada MD   omeprazole (PRILOSEC) 20 MG delayed release capsule Take 20 mg by mouth daily    Historical Provider, MD   busPIRone (BUSPAR) 5 MG tablet Take 5 mg by mouth daily    Historical Provider, MD   PARoxetine (PAXIL) 20 MG tablet Take 20 mg by mouth every morning    Historical Provider, MD   tamsulosin (FLOMAX) 0.4 MG capsule Take 0.4 mg by mouth daily    Historical Provider, MD         Allergies:  Patient has no known allergies. Social History:    TOBACCO:   reports that he has been smoking. He has smoked for the past 50.00 years. He has never used smokeless tobacco.  ETOH:   reports previous alcohol use. Family History:    Reviewed in detail and negative for DM, CAD, Cancer, CVA. Positive as follows\"      Problem Relation Age of Onset    Diabetes Father        REVIEW OF SYSTEMS:   Pertinent positives as noted in the HPI. All other systems reviewed and negative. PHYSICAL EXAM:  /79   Pulse 73   Temp 97.6 °F (36.4 °C) (Oral)   Resp 18   Ht 6' 2\" (1.88 m)   Wt 190 lb (86.2 kg)   SpO2 98%   BMI 24.39 kg/m²   General appearance: No apparent distress, appears stated age and cooperative. HEENT: Normal cephalic, atraumatic without obvious deformity. Pupils equal, round, and reactive to light. Extra ocular muscles intact. Conjunctivae/corneas clear. Neck: Supple, with full range of motion. No jugular venous distention. Trachea midline. Respiratory: Clear to auscultation bilateral  Cardiovascular:   S1, S2, no murmur  Abdomen: Soft, nontender, nondistended  Musculoskeletal: No clubbing, cyanosis, edema of bilateral lower extremities. Brisk capillary refill. Skin: Normal skin color. No rashes or lesions. Neurologic:  Neurovascularly intact without any focal sensory/motor deficits.  Cranial nerves: II-XII intact, grossly non-focal.  Psychiatric: Alert and oriented, thought content appropriate, normal insight    Reviewed EKG and CXR personally      CBC:   Recent Labs     04/17/22  1527   WBC 7.1   RBC 4.88   HGB 14.3   HCT 42.2   MCV 86.5   RDW 13.5        BMP:   Recent Labs     04/17/22  1527      K 4.6      CO2 26   BUN 15   CREATININE 1.2     LFT:  Recent Labs     04/17/22  1527   PROT 7.4   ALKPHOS 153*   ALT 39   AST 24   BILITOT 0.9     CE:  No results for input(s): Mary Coelhos in the last 72 hours. PT/INR: No results for input(s): INR, APTT in the last 72 hours. BNP: No results for input(s): BNP in the last 72 hours. ESR: No results found for: SEDRATE  CRP: No results found for: CRP  D Dimer:   Lab Results   Component Value Date    DDIMER 784 04/17/2022      Folate and B12: No results found for: PGMFMDCP11, No results found for: FOLATE  Lactic Acid: No results found for: LACTA  Thyroid Studies: No results found for: TSH, V0WDMBN, G1IANAJ, THYROIDAB    Oupatient labs:  No results found for: CHOL, TRIG, HDL, LDLCALC, TSH, PSA, INR, LABA1C    Urinalysis:    Lab Results   Component Value Date    NITRU Negative 04/17/2022    WBCUA 0-1 04/17/2022    BACTERIA NONE SEEN 04/17/2022    RBCUA NONE 04/17/2022    BLOODU Negative 04/17/2022    SPECGRAV 1.025 04/17/2022    GLUCOSEU Negative 04/17/2022       ASSESSMENT & PLAN:    Syncope  Along with dizziness, mostly on standing up from sitting position  Likely orthostatic  Check orthostatic vitals  Consult cardiology to rule out any cardiac etiology of syncope, particularly bradycardia  Fall precautions  Avoid any IV fluids as patient has elevated BNP    Coronary artery disease  Recent NSTEMI on 4/3/2022  Status post cardiac cath, PCI, manual thrombectomy  Aspirin, Brilinta, statin  No beta-blockers because of bradycardia    History of BPH  Continue Flomax    Depression  Continue paroxetine     DVT prophylaxis  Subcu Lovenox      Diet: ADULT DIET;  Regular  Code Status: Full Code  Surrogate decision maker confirmed with patient:   Extended Emergency Contact Information  Primary Emergency Contact: Luis Heredia  Home Phone: [phone]  Mobile Phone: [phone]  Relation: Spouse  Preferred language: English    DVT Prophylaxis: [x]Lovenox []Heparin []PCD [] [address] []Encouraged ambulation  Disposition: [x]Med/Surg [] Intermediate [] ICU/CCU  Admit status: [x] Observation [] Inpatient     +++++++++++++++++++++++++++++++++++++++++++++++++  Edward Hernández MD  [address]  +++++++++++++++++++++++++++++++++++++++++++++++++  NOTE: This report was transcribed using voice recognition software. Every effort was made to ensure accuracy; however, inadvertent computerized transcription errors may be present.

## 2022-04-18 ENCOUNTER — TELEPHONE (OUTPATIENT)
Dept: CARDIAC REHAB | Age: 67
End: 2022-04-18

## 2022-04-18 LAB
ANION GAP SERPL CALCULATED.3IONS-SCNC: 11 MMOL/L (ref 7–16)
BUN BLDV-MCNC: 12 MG/DL (ref 6–23)
CALCIUM SERPL-MCNC: 8.7 MG/DL (ref 8.6–10.2)
CHLORIDE BLD-SCNC: 104 MMOL/L (ref 98–107)
CHOLESTEROL, TOTAL: 110 MG/DL (ref 0–199)
CO2: 22 MMOL/L (ref 22–29)
CREAT SERPL-MCNC: 1 MG/DL (ref 0.7–1.2)
EKG ATRIAL RATE: 62 BPM
EKG P AXIS: 63 DEGREES
EKG P-R INTERVAL: 160 MS
EKG Q-T INTERVAL: 434 MS
EKG QRS DURATION: 86 MS
EKG QTC CALCULATION (BAZETT): 440 MS
EKG R AXIS: -41 DEGREES
EKG T AXIS: -70 DEGREES
EKG VENTRICULAR RATE: 62 BPM
GFR AFRICAN AMERICAN: >60
GFR NON-AFRICAN AMERICAN: >60 ML/MIN/1.73
GLUCOSE BLD-MCNC: 97 MG/DL (ref 74–99)
HCT VFR BLD CALC: 40.6 % (ref 37–54)
HDLC SERPL-MCNC: 28 MG/DL
HEMOGLOBIN: 13.6 G/DL (ref 12.5–16.5)
LDL CHOLESTEROL CALCULATED: 69 MG/DL (ref 0–99)
MCH RBC QN AUTO: 29.8 PG (ref 26–35)
MCHC RBC AUTO-ENTMCNC: 33.5 % (ref 32–34.5)
MCV RBC AUTO: 88.8 FL (ref 80–99.9)
PDW BLD-RTO: 13.5 FL (ref 11.5–15)
PLATELET # BLD: 276 E9/L (ref 130–450)
PMV BLD AUTO: 10.3 FL (ref 7–12)
POTASSIUM SERPL-SCNC: 4.1 MMOL/L (ref 3.5–5)
RBC # BLD: 4.57 E12/L (ref 3.8–5.8)
SODIUM BLD-SCNC: 137 MMOL/L (ref 132–146)
TRIGL SERPL-MCNC: 63 MG/DL (ref 0–149)
VLDLC SERPL CALC-MCNC: 13 MG/DL
WBC # BLD: 9 E9/L (ref 4.5–11.5)

## 2022-04-18 PROCEDURE — 6360000002 HC RX W HCPCS: Performed by: INTERNAL MEDICINE

## 2022-04-18 PROCEDURE — 80061 LIPID PANEL: CPT

## 2022-04-18 PROCEDURE — 85027 COMPLETE CBC AUTOMATED: CPT

## 2022-04-18 PROCEDURE — 80048 BASIC METABOLIC PNL TOTAL CA: CPT

## 2022-04-18 PROCEDURE — 2580000003 HC RX 258: Performed by: INTERNAL MEDICINE

## 2022-04-18 PROCEDURE — G0378 HOSPITAL OBSERVATION PER HR: HCPCS

## 2022-04-18 PROCEDURE — 36415 COLL VENOUS BLD VENIPUNCTURE: CPT

## 2022-04-18 PROCEDURE — 96372 THER/PROPH/DIAG INJ SC/IM: CPT

## 2022-04-18 PROCEDURE — 6370000000 HC RX 637 (ALT 250 FOR IP): Performed by: INTERNAL MEDICINE

## 2022-04-18 RX ORDER — SODIUM CHLORIDE 9 MG/ML
INJECTION, SOLUTION INTRAVENOUS CONTINUOUS
Status: DISCONTINUED | OUTPATIENT
Start: 2022-04-18 | End: 2022-04-19 | Stop reason: HOSPADM

## 2022-04-18 RX ADMIN — Medication 10 ML: at 09:39

## 2022-04-18 RX ADMIN — TICAGRELOR 90 MG: 90 TABLET ORAL at 20:21

## 2022-04-18 RX ADMIN — PAROXETINE 20 MG: 20 TABLET, FILM COATED ORAL at 09:38

## 2022-04-18 RX ADMIN — SODIUM CHLORIDE: 9 INJECTION, SOLUTION INTRAVENOUS at 20:22

## 2022-04-18 RX ADMIN — ATORVASTATIN CALCIUM 80 MG: 40 TABLET, FILM COATED ORAL at 20:21

## 2022-04-18 RX ADMIN — TICAGRELOR 90 MG: 90 TABLET ORAL at 09:38

## 2022-04-18 RX ADMIN — ASPIRIN 81 MG 81 MG: 81 TABLET ORAL at 09:38

## 2022-04-18 RX ADMIN — ENOXAPARIN SODIUM 40 MG: 100 INJECTION SUBCUTANEOUS at 09:38

## 2022-04-18 RX ADMIN — BUSPIRONE HYDROCHLORIDE 5 MG: 5 TABLET ORAL at 09:38

## 2022-04-18 RX ADMIN — PANTOPRAZOLE SODIUM 40 MG: 40 TABLET, DELAYED RELEASE ORAL at 06:23

## 2022-04-18 RX ADMIN — SODIUM CHLORIDE: 9 INJECTION, SOLUTION INTRAVENOUS at 09:41

## 2022-04-18 NOTE — TELEPHONE ENCOUNTER
Pt wife called to cancel initial eval appt at outpatient cardiac rehab d/t pt being admitted to hospital. She will call us once pt is cleared to exercise.

## 2022-04-18 NOTE — PROGRESS NOTES
Hospitalist Progress Note      SYNOPSIS: Patient admitted on 2022 for dizziness and syncope    A 41-year-old male who was recently discharged from our hospital after a 3-day stay for NSTEMI that required heart cath on 2022 status post PCI and manual thrombectomy. Patient was discharged home with aspirin, Brilinta, statins but no beta-blockers because of bradycardia. He states that he has been doing well until the evening of  when he had an episode of lightheadedness when he stood up from a seated position. He had 3 similar episodes in the afternoon on , again feeling very lightheaded and even passed out when he tried to stand up from sitting position. He denies any chest pain, nausea, vomiting, headache, double vision, shortness of breath. In the ER, he had unremarkable vitals and labs. He was admitted for observation for syncope. SUBJECTIVE:    Patient seen and examined in his room. Denies any dizziness. But states that his orthostatic vitals were positive early in the morning today. Denies any chest pain, nausea, vomiting. Records reviewed. Stable overnight. No other overnight issues reported. Temp (24hrs), Av.9 °F (36.6 °C), Min:97.6 °F (36.4 °C), Max:98.1 °F (36.7 °C)    DIET: ADULT DIET; Regular  CODE: Full Code    Intake/Output Summary (Last 24 hours) at 2022 0800  Last data filed at 2022 4441  Gross per 24 hour   Intake 450 ml   Output 1100 ml   Net -650 ml       OBJECTIVE:    BP (!) 123/91   Pulse 78   Temp 98.1 °F (36.7 °C) (Oral)   Resp 20   Ht 6' 2\" (1.88 m)   Wt 190 lb (86.2 kg)   SpO2 96%   BMI 24.39 kg/m²     General appearance: No apparent distress, appears stated age and cooperative. HEENT:  Conjunctivae/corneas clear. Neck: Supple. No jugular venous distention.    Respiratory: Clear to auscultation bilaterally, normal respiratory effort  Cardiovascular: Regular rate rhythm, normal S1-S2  Abdomen: Soft, nontender, nondistended  Musculoskeletal: No clubbing, cyanosis, no bilateral lower extremity edema. Brisk capillary refill. Skin:  No rashes  on visible skin  Neurologic: awake, alert and following commands     ASSESSMENT & PLAN:    Syncope  Along with dizziness, mostly on standing up from sitting position  Likely orthostatic  Check orthostatic vitals, positive  Consult cardiology to rule out any cardiac etiology of syncope, particularly bradycardia, recommend no echocardiogram and only outpatient stress test  Discontinued Flomax  Give gentle hydration with IV fluids over the next 24 hours     Coronary artery disease  Recent NSTEMI on 4/3/2022  Status post cardiac cath, PCI, manual thrombectomy  Aspirin, Brilinta, statin  No beta-blockers because of bradycardia     History of BPH  fLomax had to be discontinued because of severe orthostasis along with dizziness and syncope     Depression  Continue paroxetine      DVT prophylaxis  Subcu Lovenox       DISPOSITION:   We will discharge on the morning of 4/19.     Medications:  REVIEWED DAILY    Infusion Medications    sodium chloride       Scheduled Medications    aspirin  81 mg Oral Daily    atorvastatin  80 mg Oral Nightly    busPIRone  5 mg Oral Daily    pantoprazole  40 mg Oral QAM AC    PARoxetine  20 mg Oral QAM    ticagrelor  90 mg Oral BID    sodium chloride flush  5-40 mL IntraVENous 2 times per day    enoxaparin  40 mg SubCUTAneous Daily     PRN Meds: sodium chloride flush, sodium chloride, ondansetron **OR** ondansetron, polyethylene glycol, acetaminophen **OR** acetaminophen    Labs:     Recent Labs     04/17/22  1527 04/18/22  0410   WBC 7.1 9.0   HGB 14.3 13.6   HCT 42.2 40.6    276       Recent Labs     04/17/22  1527 04/18/22  0410    137   K 4.6 4.1    104   CO2 26 22   BUN 15 12   CREATININE 1.2 1.0   CALCIUM 9.0 8.7       Recent Labs     04/17/22  1527   PROT 7.4   ALKPHOS 153*   ALT 39   AST 24   BILITOT 0.9       No results for input(s): INR in the last 72 hours. No results for input(s): Virginie Comment in the last 72 hours. Chronic labs:    No results found for: CHOL, TRIG, HDL, LDLCALC, TSH, PSA, INR, LABA1C    Radiology: REVIEWED DAILY    +++++++++++++++++++++++++++++++++++++++++++++++++  Neisha Augustine MD  [facility] Physician [address]  +++++++++++++++++++++++++++++++++++++++++++++++++  NOTE: This report was transcribed using voice recognition software. Every effort was made to ensure accuracy; however, inadvertent computerized transcription errors may be present.

## 2022-04-18 NOTE — PROGRESS NOTES
26 Got telephone report from Sebastián 32. 2023 resident on call for attending notified of pt arrival to unit; orders already in     2030 Pt got assessed at this time; pt transferred to unit on RA,  Via cart, pt stood up and took few steps to bed, w/o difficulty or lightheadedness; V/s taken; pt denies any pain; pt oriented to the room, safety measures maintained; call light within reach. Pt declined for family members to be notified.

## 2022-04-18 NOTE — PLAN OF CARE
Problem: Cardiac:  Goal: Ability to maintain vital signs within normal range will improve  Description: Ability to maintain vital signs within normal range will improve  Outcome: Ongoing     Problem: Physical Regulation:  Goal: Complications related to the disease process, condition or treatment will be avoided or minimized  Description: Complications related to the disease process, condition or treatment will be avoided or minimized  Outcome: Ongoing

## 2022-04-18 NOTE — CARE COORDINATION
4/18 Care Coordination. Patient was admit from ED on 4/17 for syncope x 3. Cardio was consulted. Orthostatic BP's positive. Recommendations are to discontinue Flomax d/t causing symptomatic HPTN. Patient also received 1L NS bolus. Met with patient and wife Terris Canavan at bedside to discuss transition of care planning. Patient's PCP is Dr. Torrie Meyer and he uses [address] [facility] [facility] on [address] [facility]. Patient lives with his wife in a 2 story home with 5 CHIDI. Patient does not own any DME or have a history of HHC/NICK. Plan is to return home on discharge with no anticipated needs and Terris Canavan will provide transportation.     FRANK Jin, RN  [facility] SURGICAL [facility] Case Management   Cell: [phone]

## 2022-04-18 NOTE — PLAN OF CARE
Problem: Cardiac:  Goal: Ability to maintain vital signs within normal range will improve  Description: Ability to maintain vital signs within normal range will improve  Outcome: Met This Shift     Problem: Physical Regulation:  Goal: Complications related to the disease process, condition or treatment will be avoided or minimized  Description: Complications related to the disease process, condition or treatment will be avoided or minimized  Outcome: Met This Shift

## 2022-04-18 NOTE — PROGRESS NOTES
4907 Received a called from Dr. Marion(cardiology) to place an order in for orthostatic Bp for this morning.    0620 Obtained Ortho/BP see v/s. While obtaining these readings pt got diaphoretic/lightheaded and nauseated when he stood up; Pt was place supine again and then semi mcadams; a cold compress was placed on his forehead for comfort    0625 Re-took BP while semi-fowlers in bed  123/91 HR 78; pt stating not feeling dizzy anymore.      [address] out a page to Dr. Torie Cabral to notify of positive orthostatic BP this morning

## 2022-04-18 NOTE — CONSULTS
Relation Age of Onset    Diabetes Father        Social History:  Social History     Socioeconomic History    Marital status:      Spouse name: Dee Dee Salmeron Number of children: 0    Years of education: Not on file    Highest education level: Not on file   Occupational History     Comment: retired   Tobacco Use    Smoking status: Current Every Day Smoker     Years: 50.00    Smokeless tobacco: Never Used   Vaping Use    Vaping Use: Some days    Substances: CBD, marijuana    Devices: Pre-filled pod   Substance and Sexual Activity    Alcohol use: Not Currently     Comment: does nopt drink alcohol    Drug use: Yes     Types: Marijuana (Weed)     Comment: smokes marijuana several times daily/no cigs    Sexual activity: Yes     Partners: Female   Other Topics Concern    Not on file   Social History Narrative    Not on file     Social Determinants of Health     Financial Resource Strain:     Difficulty of Paying Living Expenses: Not on file   Food Insecurity:     Worried About Running Out of Food in the Last Year: Not on file    Mabel of Food in the Last Year: Not on file   Transportation Needs:     Lack of Transportation (Medical): Not on file    Lack of Transportation (Non-Medical):  Not on file   Physical Activity:     Days of Exercise per Week: Not on file    Minutes of Exercise per Session: Not on file   Stress:     Feeling of Stress : Not on file   Social Connections:     Frequency of Communication with Friends and Family: Not on file    Frequency of Social Gatherings with Friends and Family: Not on file    Attends Hindu Services: Not on file    Active Member of Clubs or Organizations: Not on file    Attends Club or Organization Meetings: Not on file    Marital Status: Not on file   Intimate Partner Violence:     Fear of Current or Ex-Partner: Not on file    Emotionally Abused: Not on file    Physically Abused: Not on file    Sexually Abused: Not on file   Housing Stability:     Unable to Pay for Housing in the Last Year: Not on file    Number of Places Lived in the Last Year: Not on file    Unstable Housing in the Last Year: Not on file       Allergies:  No Known Allergies    Current Medications:  Current Facility-Administered Medications   Medication Dose Route Frequency Provider Last Rate Last Admin    aspirin chewable tablet 81 mg  81 mg Oral Daily Dano Zamudio MD   81 mg at 04/17/22 2055    atorvastatin (LIPITOR) tablet 80 mg  80 mg Oral Nightly Dano Zamudio MD   80 mg at 04/17/22 2055    busPIRone (BUSPAR) tablet 5 mg  5 mg Oral Daily Dano Zamudio MD   5 mg at 04/17/22 2055    pantoprazole (PROTONIX) tablet 40 mg  40 mg Oral QAM AC Dano Zamudio MD   40 mg at 04/18/22 6844    PARoxetine (PAXIL) tablet 20 mg  20 mg Oral QAM Dano Zamudio MD        tamsulosin (FLOMAX) capsule 0.4 mg  0.4 mg Oral Daily Dano Zamudio MD   0.4 mg at 04/17/22 2056    ticagrelor (BRILINTA) tablet 90 mg  90 mg Oral BID Dano Zamudio MD   90 mg at 04/17/22 2148    sodium chloride flush 0.9 % injection 5-40 mL  5-40 mL IntraVENous 2 times per day Dano Zamudio MD   10 mL at 04/17/22 2056    sodium chloride flush 0.9 % injection 5-40 mL  5-40 mL IntraVENous PRN Dano Zamudio MD        0.9 % sodium chloride infusion   IntraVENous PRN Dano Zamudio MD        enoxaparin (LOVENOX) injection 40 mg  40 mg SubCUTAneous Daily Dano Zamudio MD   40 mg at 04/17/22 2055    ondansetron (ZOFRAN-ODT) disintegrating tablet 4 mg  4 mg Oral Q8H PRN Dano Zamudio MD        Or    ondansetron (ZOFRAN) injection 4 mg  4 mg IntraVENous Q6H PRN Dano Zamudio MD        polyethylene glycol (GLYCOLAX) packet 17 g  17 g Oral Daily PRN Dano Zamudio MD        acetaminophen (TYLENOL) tablet 650 mg  650 mg Oral Q6H PRN Dano Zamudio MD        Or    acetaminophen (TYLENOL) suppository 650 mg  650 mg Rectal Q6H PRN Dano Zamudio MD          sodium chloride         Physical Exam:  /77   Pulse 62   Temp 98.1 °F (36.7 °C) (Oral)   Resp 20   Ht 6' 2\" (1.88 m)   Wt 190 lb (86.2 kg)   SpO2 98%   BMI 24.39 kg/m²   Weight change: Wt Readings from Last 3 Encounters:   04/17/22 190 lb (86.2 kg)   04/04/22 202 lb 2.6 oz (91.7 kg)         General: Awake, alert, oriented x3, no acute distress  HEENT: Unremarkable  Neck: No JVD or bruits. Cardiac: Regular rate and rhythm, normal S1 and S2, no extra heart sounds, murmurs, heaves, thrills  Resp: Lungs clear without wheezing or crackles. No accessory muscle use or retraction  Abdomen: soft, nontender, nondistended, no gross organomegaly or mass  Skin: Warm and dry, no cyanosis. Musculoskeletal: No identified joint deformity  Neuro: Grossly unremarkable  Psych: Cooperative, and normal affect    Intake/Output:    Intake/Output Summary (Last 24 hours) at 4/18/2022 0659  Last data filed at 4/18/2022 0625  Gross per 24 hour   Intake 450 ml   Output 1100 ml   Net -650 ml     I/O this shift: In: 450 [P.O.:440; I.V.:10]  Out: 1100 [Urine:1100]    Laboratory Tests:  Lab Results   Component Value Date    CREATININE 1.0 04/18/2022    BUN 12 04/18/2022     04/18/2022    K 4.1 04/18/2022     04/18/2022    CO2 22 04/18/2022     No results for input(s): CKTOTAL, CKMB in the last 72 hours.     Invalid input(s): TROPONONI  No results found for: BNP  Lab Results   Component Value Date    WBC 9.0 04/18/2022    RBC 4.57 04/18/2022    HGB 13.6 04/18/2022    HCT 40.6 04/18/2022    MCV 88.8 04/18/2022    MCH 29.8 04/18/2022    MCHC 33.5 04/18/2022    RDW 13.5 04/18/2022     04/18/2022    MPV 10.3 04/18/2022     Recent Labs     04/17/22  1527   ALKPHOS 153*   ALT 39   AST 24   PROT 7.4   BILITOT 0.9   LABALBU 3.8     Lab Results   Component Value Date    MG 1.9 04/05/2022     No results found for: PROTIME, INR  No results found for: TSH  No components found for: CHLPL  No results found for: TRIG  No results found for: HDL  No results found for: LDLCALC  No results found for: INR    Admission ECG reviewed by me revealed sinus rhythm, age-indeterminate inferoposterior infarct with secondary ST/T wave abnormalities. ASSESSMENT / PLAN:    #1.  Presyncope with orthostatic hypotension- probably secondary at least in part to the Flomax he is on for prostate hypertrophy, as the Flomax is notorious for causing symptomatic orthostatic hypotension. He has already received a liter of IV normal saline, and since Flomax cannot be decreased to 0.2 mg daily, would stop it and see how he tolerates its discontinuation from a urinary standpoint. Does not need repeat echocardiogram.  Recheck orthostatic vital signs every shift. #2.  Coronary disease-post circumflex drug-eluting stent and with ischemic testing planned for the near future to evaluate borderline obstructive LAD stenosis. Dual antiplatelet therapy to be continued at this time and for least a year. Does not need inpatient stress testing. #3. Hyperlipidemia- his high-dose statin is to be continued for an LDL goal below 70 and preferably 50. #4.  Continue to follow.       Electronically signed by Art Brasher MD on 4/18/2022 at 6:59 AM

## 2022-04-19 VITALS
RESPIRATION RATE: 14 BRPM | HEIGHT: 74 IN | HEART RATE: 68 BPM | OXYGEN SATURATION: 97 % | WEIGHT: 190 LBS | DIASTOLIC BLOOD PRESSURE: 79 MMHG | SYSTOLIC BLOOD PRESSURE: 107 MMHG | TEMPERATURE: 98.1 F | BODY MASS INDEX: 24.38 KG/M2

## 2022-04-19 LAB
ANION GAP SERPL CALCULATED.3IONS-SCNC: 9 MMOL/L (ref 7–16)
BUN BLDV-MCNC: 8 MG/DL (ref 6–23)
CALCIUM SERPL-MCNC: 8.2 MG/DL (ref 8.6–10.2)
CHLORIDE BLD-SCNC: 108 MMOL/L (ref 98–107)
CO2: 22 MMOL/L (ref 22–29)
CREAT SERPL-MCNC: 1.1 MG/DL (ref 0.7–1.2)
GFR AFRICAN AMERICAN: >60
GFR NON-AFRICAN AMERICAN: >60 ML/MIN/1.73
GLUCOSE BLD-MCNC: 106 MG/DL (ref 74–99)
HCT VFR BLD CALC: 39 % (ref 37–54)
HEMOGLOBIN: 13.4 G/DL (ref 12.5–16.5)
MCH RBC QN AUTO: 29.6 PG (ref 26–35)
MCHC RBC AUTO-ENTMCNC: 34.4 % (ref 32–34.5)
MCV RBC AUTO: 86.1 FL (ref 80–99.9)
PDW BLD-RTO: 13.4 FL (ref 11.5–15)
PLATELET # BLD: 264 E9/L (ref 130–450)
PMV BLD AUTO: 9.9 FL (ref 7–12)
POTASSIUM SERPL-SCNC: 4.1 MMOL/L (ref 3.5–5)
RBC # BLD: 4.53 E12/L (ref 3.8–5.8)
SODIUM BLD-SCNC: 139 MMOL/L (ref 132–146)
WBC # BLD: 4.5 E9/L (ref 4.5–11.5)

## 2022-04-19 PROCEDURE — 96372 THER/PROPH/DIAG INJ SC/IM: CPT

## 2022-04-19 PROCEDURE — 36415 COLL VENOUS BLD VENIPUNCTURE: CPT

## 2022-04-19 PROCEDURE — 85027 COMPLETE CBC AUTOMATED: CPT

## 2022-04-19 PROCEDURE — 2580000003 HC RX 258: Performed by: INTERNAL MEDICINE

## 2022-04-19 PROCEDURE — G0378 HOSPITAL OBSERVATION PER HR: HCPCS

## 2022-04-19 PROCEDURE — 6370000000 HC RX 637 (ALT 250 FOR IP): Performed by: INTERNAL MEDICINE

## 2022-04-19 PROCEDURE — 6360000002 HC RX W HCPCS: Performed by: INTERNAL MEDICINE

## 2022-04-19 PROCEDURE — 80048 BASIC METABOLIC PNL TOTAL CA: CPT

## 2022-04-19 RX ADMIN — PANTOPRAZOLE SODIUM 40 MG: 40 TABLET, DELAYED RELEASE ORAL at 05:55

## 2022-04-19 RX ADMIN — ENOXAPARIN SODIUM 40 MG: 100 INJECTION SUBCUTANEOUS at 09:25

## 2022-04-19 RX ADMIN — ASPIRIN 81 MG 81 MG: 81 TABLET ORAL at 09:25

## 2022-04-19 RX ADMIN — PAROXETINE 20 MG: 20 TABLET, FILM COATED ORAL at 09:25

## 2022-04-19 RX ADMIN — BUSPIRONE HYDROCHLORIDE 5 MG: 5 TABLET ORAL at 09:25

## 2022-04-19 RX ADMIN — SODIUM CHLORIDE: 9 INJECTION, SOLUTION INTRAVENOUS at 05:56

## 2022-04-19 RX ADMIN — TICAGRELOR 90 MG: 90 TABLET ORAL at 09:25

## 2022-04-19 ASSESSMENT — PAIN SCALES - GENERAL: PAINLEVEL_OUTOF10: 0

## 2022-04-19 NOTE — PROGRESS NOTES
DAILY PROGRESS NOTE -[facility] CARDIOLOGY    SUBJECTIVE:    Is being followed for symptomatic orthostatic hypotension secondary at least in part to Flomax. Has received at least 2 L of IV normal saline and feeling fine. Minimal dizziness on standing up yesterday and still receiving hydration. Hyperlipidemia, prostate hypertrophy with elevated PSA for which he was on Flomax. CAD post STEMI April 2022 with heart catheterization showing thrombotic circumflex occlusion post thrombectomy and NATHANIEL placement along with borderline obstructive LAD disease but preserved ejection fraction. OBJECTIVE:    His vital signs were reviewed today. Vitals:    04/19/22 0000   BP: 106/73   Pulse: 85   Resp: 16   Temp: 99 °F (37.2 °C)   SpO2: 96%       Scheduled Meds:   aspirin  81 mg Oral Daily    atorvastatin  80 mg Oral Nightly    busPIRone  5 mg Oral Daily    pantoprazole  40 mg Oral QAM AC    PARoxetine  20 mg Oral QAM    ticagrelor  90 mg Oral BID    sodium chloride flush  5-40 mL IntraVENous 2 times per day    enoxaparin  40 mg SubCUTAneous Daily     Continuous Infusions:   sodium chloride 100 mL/hr at 04/19/22 0556    sodium chloride       PRN Meds:.sodium chloride flush, sodium chloride, ondansetron **OR** ondansetron, polyethylene glycol, acetaminophen **OR** acetaminophen    REVIEW OF SYSTEMS:     No pruritus, rash, bruising. Cardiac symptoms per HPI. No nausea, vomiting, abdominal pain, GI bleeding, change in bowel habits. No dysuria, urinary frequency, urgency, hematuria, flank pain. Joint pain but no muscle soreness, stiffness, aching. No headache, speech disturbance, lateralizing neurologic deficit. No hemoptysis, epistaxis, easy bruising. No anxiety, depression. No symptoms of hypothyroidism, hyperthyroidism, diabetes, heat or cold intolerance. FAMILY HISTORY: Negative for CAD in first-degree relatives. SOCIAL HISTORY: Negative for alcohol, tobacco, or illicit drug use.       PHYSICAL EXAM:    General Appearance:  awake, alert, oriented, in no acute distress  Neck:  no bruits  Lungs:  Normal expansion. Clear to auscultation. No rales, rhonchi, or wheezing. Heart:  Heart sounds are normal.  Regular rate and rhythm without murmur, gallop or rub. Abdomen:  Soft, non-tender. Extremities: Extremities warm to touch, pink, with no edema. Neuro/musculosketal:  Unremarkable. LABS:    Recent Labs     04/17/22  1527 04/18/22  0410 04/19/22  0519    137 139   CREATININE 1.2 1.0 1.1       Recent Labs     04/17/22  1527 04/18/22  0410 04/19/22  0519   HGB 14.3 13.6 13.4       No results for input(s): INR in the last 72 hours. IMPRESSION:    #1.  Presyncope and orthostatic hypotension- felt strongly that it was due to the Flomax he was on and it was stopped yesterday. No problem urinating since Flomax was stopped and would recommend that Flomax remain discontinued. Was not orthostatic this morning and feel he can be discharged today. Already has appointment scheduled with Dr. Star Cheney in the near future in our offices. Sign off. #2.  Coronary disease post circumflex NATHANIEL and borderline obstructive LAD disease- dual antiplatelet therapy to be continued at this time with plans for nuclear stress testing in the near future to evaluate for LAD ischemia. No plans for inpatient stress testing at this time. #3. Hyperlipidemia-high-dose statin to be continued for LDL goal below 70 preferably 50. #4.  As above, dual antiplatelet therapy with ticagrelor and aspirin to be continued for at least 1 year. #5.  As above, no longer orthostatic and he may be discharged home later on today. Avoid excessive caffeine use and liberalize fluid intake as outpatient. No further Flomax if possible. Already has appointment scheduled with Dr. Star Cheney in the near future in our offices. Sign off.

## 2022-04-19 NOTE — DISCHARGE SUMMARY
Hospitalist Discharge Summary    Patient ID: Santosh Gavin   Patient : 1955  Patient's PCP: Michael Valdez DO    Admit Date: 2022   Admitting Physician: Vianey Lozano MD    Discharge Date:  2022   Discharge Physician: Latisha Barkley MD   Discharge Condition: Stable  Discharge Disposition: [facility] course in brief:  (Please refer to daily progress notes for a comprehensive review of the hospitalization by requesting medical records)    Patient admitted on 2022 for dizziness and syncope     A 52-year-old male who was recently discharged from our hospital after a 3-day stay for NSTEMI that required heart cath on 2022 status post PCI and manual thrombectomy.  Patient was discharged home with aspirin, Brilinta, statins but no beta-blockers because of bradycardia. He states that he has been doing well until the evening of  when he had an episode of lightheadedness when he stood up from a seated position. Gene Marsh had 3 similar episodes in the afternoon on , again feeling very lightheaded and even passed out when he tried to stand up from sitting position. Gene Marsh denies any chest pain, nausea, vomiting, headache, double vision, shortness of breath. In the ER, he had unremarkable vitals and labs. Gene Marsh was admitted for observation for syncope.   Check orthostatic vitals, positive  Consult cardiology to rule out any cardiac etiology of syncope, particularly bradycardia, recommend no echocardiogram and only outpatient stress test  Discontinued Flomax  Give gentle hydration with IV fluids over the next 24 hours. Discharge home without Flomax.   Follow-up with cardiology for further evaluation and management if symptoms persist.      Consults:   IP CONSULT TO INTERNAL MEDICINE  IP CONSULT TO CARDIOLOGY    Discharge Diagnoses:    Syncope  Along with dizziness, mostly on standing up from sitting position  Likely orthostatic  Check orthostatic vitals, positive  Consult cardiology to rule out any cardiac etiology of syncope, particularly bradycardia, recommend no echocardiogram and only outpatient stress test  Discontinued Flomax  Give gentle hydration with IV fluids over the next 24 hours     Coronary artery disease  Recent NSTEMI on 4/3/2022  Status post cardiac cath, PCI, manual thrombectomy  Aspirin, Brilinta, statin  No beta-blockers because of bradycardia     History of BPH  fLomax had to be discontinued because of severe orthostasis along with dizziness and syncope     Depression  Continue paroxetine      DVT prophylaxis  Subcu Lovenox    Discharge Instructions / Follow up:    No future appointments. Continued appropriate risk factor modification of blood pressure, diabetes and serum lipids will remain essential to reducing risk of future atherosclerotic development    Activity: activity as tolerated    Significant labs:  CBC:   Recent Labs     04/17/22  1527 04/18/22  0410 04/19/22  0519   WBC 7.1 9.0 4.5   RBC 4.88 4.57 4.53   HGB 14.3 13.6 13.4   HCT 42.2 40.6 39.0   MCV 86.5 88.8 86.1   RDW 13.5 13.5 13.4    276 264     BMP:   Recent Labs     04/17/22  1527 04/18/22  0410 04/19/22  0519    137 139   K 4.6 4.1 4.1    104 108*   CO2 26 22 22   BUN 15 12 8   CREATININE 1.2 1.0 1.1     LFT:  Recent Labs     04/17/22  1527   PROT 7.4   ALKPHOS 153*   ALT 39   AST 24   BILITOT 0.9     PT/INR: No results for input(s): INR, APTT in the last 72 hours. BNP: No results for input(s): BNP in the last 72 hours.   Hgb A1C: No results found for: LABA1C  Folate and B12: No results found for: [id number], No results found for: FOLATE  Thyroid Studies: No results found for: TSH, S7GNUQX, L6ULQUA, THYROIDAB    Urinalysis:    Lab Results   Component Value Date    NITRU Negative 04/17/2022    WBCUA 0-1 04/17/2022    BACTERIA NONE SEEN 04/17/2022    RBCUA NONE 04/17/2022    BLOODU Negative 04/17/2022    SPECGRAV 1.025 04/17/2022    GLUCOSEU Negative 04/17/2022       Imaging:  Echo Peak Gradient: cm^2                          2.3 mmHg  4.2 mmHg                                        Estimated RVSP: 48.4 mmHg  MV Mean Gradient: LVOT VTI: 23.7 cm             Estimated RAP:15 mmHg  1.4 mmHg          IVRT: 96.9 msec  MV Mean Velocity: Estimated PASP: 48.41 mmHg  0.54 m/s          Pulm. Vein A Reversal         TR Velocity:2.89 m/s  MV Deceleration   Duration:156.9 msec           TR Gradient:33.41 mmHg  Time: 196.5 msec  Pulm. Vein D Velocity:0.45    PV Peak Velocity: 0.96 m/s  MV P1/2t: 77.8    m/sPulm. Vein A Reversal      PV Peak Gradient: 3.66  msec              Velocity:0.37 m/s             mmHg  MVA by PHT:2.83   Pulm. Vein S Velocity: 0.44   PV Mean Velocity: 0.67 m/s  cm^2              m/s                           PV Mean Gradient: 2 mmHg  MV Area  (continuity): 2.6  cm^2  MV E' Septal  Velocity: 0.06  m/s  MV E' Lateral  Velocity: 7 m/s  MR Velocity: 3.78  m/s  MR VTI: 124.2 cm  [email or web address]    XR CHEST PORTABLE    Result Date: 4/17/2022  EXAMINATION: ONE XRAY VIEW OF THE CHEST 4/17/2022 3:44 pm COMPARISON: 04/04/2022 HISTORY: ORDERING SYSTEM PROVIDED HISTORY: syncope TECHNOLOGIST PROVIDED HISTORY: Reason for exam:->syncope What reading provider will be dictating this exam?->CRC FINDINGS: The lungs are without acute focal process. There is no effusion or pneumothorax. The cardiomediastinal silhouette is without acute process. The osseous structures are without acute process. No acute process. XR CHEST PORTABLE    Result Date: 4/4/2022  EXAMINATION: ONE XRAY VIEW OF THE CHEST 4/4/2022 11:08 am COMPARISON: None. HISTORY: ORDERING SYSTEM PROVIDED HISTORY: fever TECHNOLOGIST PROVIDED HISTORY: Reason for exam:->fever What reading provider will be dictating this exam?->CRC FINDINGS: Heart size is normal.  Pulmonary vascularity is borderline congested.   Lungs are clear. Neither costophrenic angle is blunted. Borderline pulmonary vascular congestion suggesting fluid overload and or elevated left heart filling pressures. CTA PULMONARY W CONTRAST    Result Date: 4/17/2022  EXAMINATION: CTA OF THE CHEST 4/17/2022 4:59 pm TECHNIQUE: CTA of the chest was performed after the administration of intravenous contrast.  Multiplanar reformatted images are provided for review. MIP images are provided for review. Dose modulation, iterative reconstruction, and/or weight based adjustment of the mA/kV was utilized to reduce the radiation dose to as low as reasonably achievable. COMPARISON: None. HISTORY: ORDERING SYSTEM PROVIDED HISTORY: elevated d dimer TECHNOLOGIST PROVIDED HISTORY: Reason for exam:->elevated d dimer Decision Support Exception - unselect if not a suspected or confirmed emergency medical condition->Emergency Medical Condition (MA) What reading provider will be dictating this exam?->CRC FINDINGS: Pulmonary Arteries: Pulmonary arteries are adequately opacified for evaluation. No evidence of intraluminal filling defect to suggest pulmonary embolism. Main pulmonary artery is normal in caliber. Mediastinum: No evidence of mediastinal lymphadenopathy. The heart and pericardium demonstrate no acute abnormality. There is no acute abnormality of the thoracic aorta. Moderate amount of calcification of coronary arteries. Moderate size hiatal hernia containing part of the stomach. Lungs/pleura: The lungs are without acute process. No focal consolidation or pulmonary edema. No evidence of pleural effusion or pneumothorax. Upper Abdomen: 2 mm nonobstructing stone of midpole of the left kidney. Soft Tissues/Bones: No acute bone or soft tissue abnormality. No evidence of pulmonary embolism or acute pulmonary abnormality. Hiatal hernia containing part of the stomach. 2 mm nonobstructing stone of the midpole of the left kidney.  Moderate amount of calcification coronary arteries. US CAROTID ARTERY BILATERAL    Result Date: 4/17/2022  EXAMINATION: ULTRASOUND EVALUATION OF THE CAROTID ARTERIES 4/17/2022 TECHNIQUE: Duplex ultrasound using B-mode/gray scaled imaging, Doppler spectral analysis and color flow Doppler was obtained of the carotid arteries. COMPARISON: None. HISTORY: ORDERING SYSTEM PROVIDED HISTORY: syncope TECHNOLOGIST PROVIDED HISTORY: Reason for exam:->syncope What reading provider will be dictating this exam?->CRC FINDINGS: RIGHT: The right common carotid artery demonstrates peak systolic velocities of 648.1 cm/sec in the proximal and distal segments respectively. The right internal carotid artery demonstrates the systolic velocities of 49.9 cm/sec in the proximal, mid and distal segments respectively. The external carotid artery is patent. The vertebral artery demonstrates normal antegrade flow. No evidence of focal atherosclerotic plaque. ICA/CCA ratio of 0.9 LEFT: The left common carotid artery demonstrates peak systolic velocities of 166.2 cm/sec in the proximal and distal segments respectively. The left internal carotid artery demonstrates the systolic velocities of 482.0 cm/sec in the proximal, mid and distal segments respectively. The external carotid artery is patent. The vertebral artery demonstrates normal antegrade flow. There is minimal focal calcified plaque within the bulb near the origin of the internal carotid artery. Spectral analysis of blood flow by Doppler reveals no signs of turbulence ICA/CCA ratio of 0.9     The right internal carotid artery demonstrates 0-50% stenosis. The left internal carotid artery demonstrates 0-50% stenosis. Bilateral vertebral arteries are patent with flow in the normal direction.  RECOMMENDATIONS: Unavailable       Discharge Medications:      Medication List      CONTINUE taking these medications    aspirin 81 MG chewable tablet  Take 1 tablet by mouth daily     atorvastatin 80 MG tablet  Commonly known as: LIPITOR  Take 1 tablet by mouth nightly     busPIRone 5 MG tablet  Commonly known as: BUSPAR     omeprazole 20 MG delayed release capsule  Commonly known as: PRILOSEC     PARoxetine 20 MG tablet  Commonly known as: PAXIL     ticagrelor 90 MG Tabs tablet  Commonly known as: BRILINTA  Take 1 tablet by mouth 2 times daily        STOP taking these medications    tamsulosin 0.4 MG capsule  Commonly known as: FLOMAX            Time Spent on discharge is more than 45 minutes in the examination, evaluation, counseling and review of medications and discharge plan.    +++++++++++++++++++++++++++++++++++++++++++++++++  Loretta Martinez MD  [address]  +++++++++++++++++++++++++++++++++++++++++++++++++  NOTE: This report was transcribed using voice recognition software. Every effort was made to ensure accuracy; however, inadvertent computerized transcription errors may be present.

## 2022-04-29 ENCOUNTER — HOSPITAL ENCOUNTER (OUTPATIENT)
Dept: CARDIAC REHAB | Age: 67
Setting detail: THERAPIES SERIES
Discharge: HOME | End: 2022-04-29
Payer: MEDICARE

## 2022-04-29 VITALS — WEIGHT: 186.4 LBS | RESPIRATION RATE: 20 BRPM | HEIGHT: 74 IN | OXYGEN SATURATION: 98 % | BODY MASS INDEX: 23.92 KG/M2

## 2022-04-29 PROCEDURE — 93798 PHYS/QHP OP CAR RHAB W/ECG: CPT

## 2022-04-29 PROCEDURE — 93797 PHYS/QHP OP CAR RHAB WO ECG: CPT

## 2022-04-29 ASSESSMENT — EJECTION FRACTION
EF_VALUE: 55
EF_VALUE: 55

## 2022-04-29 ASSESSMENT — PATIENT HEALTH QUESTIONNAIRE - PHQ9
2. FEELING DOWN, DEPRESSED OR HOPELESS: 0
SUM OF ALL RESPONSES TO PHQ9 QUESTIONS 1 & 2: 0
SUM OF ALL RESPONSES TO PHQ QUESTIONS 1-9: 0
1. LITTLE INTEREST OR PLEASURE IN DOING THINGS: 0
SUM OF ALL RESPONSES TO PHQ QUESTIONS 1-9: 0

## 2022-04-29 ASSESSMENT — EXERCISE STRESS TEST
PEAK_BP: 119/82
PEAK_RPE: 13
PEAK_HR: 100
PEAK_BP: 110/66

## 2022-04-29 NOTE — CARDIO/PULMONARY
PT. S/P NSTEMI  AND STENT X1 ON 4-3-2022 AND 4-4-2022. PT STATES HE DOES HAVE SOME UPPER AND LOWER BACK PAIN AT TIMES. NO EXERCISE OR EQUIPMENT LIMITATIONS. NO SWELLING OF FEET OR ANKLES. PT HAS HX OF ANXIETY ON MEDICATION AND IT IS CONTROLLED. PHQ 9 SCORE IS A 0. PT HAS ALOW FALLS RISK.

## 2022-05-02 ENCOUNTER — HOSPITAL ENCOUNTER (OUTPATIENT)
Dept: CARDIAC REHAB | Age: 67
Setting detail: THERAPIES SERIES
Discharge: HOME | End: 2022-05-02
Payer: MEDICARE

## 2022-05-02 VITALS — WEIGHT: 184.6 LBS | BODY MASS INDEX: 23.69 KG/M2 | HEIGHT: 74 IN

## 2022-05-02 PROCEDURE — 93797 PHYS/QHP OP CAR RHAB WO ECG: CPT

## 2022-05-02 PROCEDURE — 93798 PHYS/QHP OP CAR RHAB W/ECG: CPT

## 2022-05-02 NOTE — PROGRESS NOTES
CARDIAC REHABILITATION NUTRITION ASSESSMENT    NAME: Tianna Lubin : 1955 AGE: 73 y.o. GENDER: male    CARDIAC REHAB ADMITTING DIAGNOSIS: S/P stemi, s/p cardiac cathterization with angioplasty with stent x 1       PROBLEM LIST:    Patient Active Problem List   Diagnosis    NSTEMI (non-ST elevated myocardial infarction) ([facility] [address]    Chest pain    Syncope and collapse    Syncope, non cardiac     Additional pertinent past medical/surgical history: bradycardia,     LABS:    No results found for: HBA1C, [id number]    Lab Results   Component Value Date    CHOL 110 2022    HDL 28 2022    LDLCALC 69 2022    TRIG 63 2022    LABVLDL 13 2022        MEDICATIONS/SUPPLEMENTS:      Prior to Admission medications    Medication Sig Start Date End Date Taking? Authorizing Provider   aspirin 81 MG chewable tablet Take 1 tablet by mouth daily 22   Martínez Monica, MD   atorvastatin (LIPITOR) 80 MG tablet Take 1 tablet by mouth nightly 22   Martínez Monica, MD   ticagrelor (BRILINTA) 90 MG TABS tablet Take 1 tablet by mouth 2 times daily 22   Martínez Quiet, MD   omeprazole (PRILOSEC) 20 MG delayed release capsule Take 20 mg by mouth daily    Historical Provider, MD   busPIRone (BUSPAR) 5 MG tablet Take 5 mg by mouth daily    Historical Provider, MD   PARoxetine (PAXIL) 20 MG tablet Take 20 mg by mouth every morning    Historical Provider, MD     Additional Pertinent Lipid Lowering medication: Lipitor per order- no grapefruit and grapefruit products. Patient and wife verbalized understanding.         ANTHROPOMETRICS:    Ht Readings from Last 1 Encounters:   22 6' 2\" (1.88 m)      Wt Readings from Last 10 Encounters:   22 184 lb 9.6 oz (83.7 kg)   22 186 lb 6.4 oz (84.6 kg)   22 190 lb (86.2 kg)   22 202 lb 2.6 oz (91.7 kg)                 IBW:190  # +/- 10%       %IBW: 96.8%              BMI: 23.8 ( normal weight)     Waist: 40  Inches ( normal) RD educated patient and wife on waist circumference for males under 40 inches. Patient and wife verbalized understanding         Reported Wt Hx:  Weight prior to MI: 200 pounds +/-4 pounds per pt. Cardiac rehab admit weight: 186 pounds. Patient lost 2 pounds over 1 week indicating 1% weight loss. Weight loss not significant however not beneficial. Patient reports some muscle loss and fat loss but is slowly regaining strength and muscle back. Will continue to monitor. Reported Diet Hx:Patient is eating 3 meals per day. Patient use to eat donut for breakfast but now eating cereal ; did not like fruit and now eating fruit; only eats potatoes, onion sand corn, does not eat eggs; no seafood; drinks 2% milk and eating whole grains. ; no food allergies; no cultural, Congregation or ethnic food preferences; not eating at sit down restaurants; no longereating  fast food         Rate Your Plate Score:not available     (Score 58-72: Making many healthy choices; 41-57: Some choices need improving 24-40: many choices need improving)         24 HOUR DIET RECALL    Breakfast : 7 am, at home, 1 glass orange juice  And 1 hour later, 1 serving raisin bran cereal with 2% milk, ? Beverage?          Lunch: 12 noon, at home, 1 roasted chicken sandwich on white whole grain bread with 1/2 piece Swiss Cheese and 1 handful SunCchips and 1 handful green grapes and water to drink         Dinner: 6:00 pm, at home, 1 1/2 cups spaghetti with turkey meatball in tomato sauce,  1 dinner roll with margarine, 1 cup 2% milk      Snack: 7 pm, at home, 1 glass Simply Best Lemonade     Snacks:  8 pm, at home,  1 apple      Beverages: 10:30 am, at home, 1 glasses apple juice     Patient is drinking 2 1/2 large water bottles (close to 2 liters water per day)      Sophie Callicoon     Environmental/Social:no alcohol consumption; has adequate finances for food      NUTRITION INTERVENTION:    Nutrition 30 / 60 minute one-on-one education & goal setting with Ariane Fernandes and wife, Lynette Huerta. Reviewed with Ariane Fernandes  And wife relevant labs compared to ideals. Reviewed weight history and patient's verbalized weight goal as well as any real or perceived barriers to obtaining the goal. Collaborated with patient to set a specific short and long term weight goal.         Reviewed Rate Your Plate directions  and conducted a verbal diet recall. Assessed for environmental, financial, psychosocial, physical and comorbidities that may impact the food and eating patterns / behaviors of Brianna Perkins with patient to set specific nutrient goals as well as specific food / behavior changes that will help patient meet the overall goal of following a heart healthy eating pattern (using guidelines as set forth by the American Heart Association and modeled after healthful eating patterns as recognized by the USDA Dietary Guidelines such as DASH, Mediterranean or plant-based). Briefly reviewed with Ariane Fernandes and wife the nutrition information: my plate, dietary resolutions, food log, menu planning, serving size, MNT Heart Healthy Low sodium and encouraged Ariane Fernandes  And wife to read thoroughly, ask questions as needed, and use for future reference for heart healthy nutrition information. Ariane Fernandes  is not scheduled to participate in Cardiac Rehab group nutrition classes. PATIENT GOALS:         Weight Goals:Patient will try to gain 1 to 2 pounds dry weight    Short Term Weight Goal:184- 186 lbs    Long Term Weight Goal:190 lbs and re-evaulate BMI          Nutrition Goals:Patient will try to follow my plate guidelines. Daily Recommendations: Patient will try to eat at least 3 meals and 1 to 2 snacks per day. Calories: 2300 calories  /day plus additional calories for weight gain.      (using [address] ([id number]) with AF 1.3 )     Estimated Total Protein needs: 68- to 86 grams/day ( based on 0.8 to one gram/kg IBW)    Estimated Total Fluid needs: 2000- 2400 ml/day ( based on 25 to 30 ml/kg actual body weight)     Saturated Fat: no more than 20  g/day    Trans Fat: 0 g/day    Sodium: no more than 1500 mg/day    Fruit: 3 cups / day    Vegetables: 2 -3  cups/day         Other:    - read and compare food labels    -Continue to try fruits and vegetables     Keeping a food diary was recommended.     -complete rate your plate form. Patient is a phase II participant and ITP done by exercise physiology staff. Questions addressed. Follow-up plans discussed Contact phone number given. Memory Binning verbalized understanding. Amita Grady MA, RDN, LD  Contact phone number: [phone].

## 2022-05-04 ENCOUNTER — HOSPITAL ENCOUNTER (OUTPATIENT)
Dept: CARDIAC REHAB | Age: 67
Setting detail: THERAPIES SERIES
Discharge: HOME | End: 2022-05-04
Payer: MEDICARE

## 2022-05-04 PROCEDURE — 93798 PHYS/QHP OP CAR RHAB W/ECG: CPT

## 2022-05-06 ENCOUNTER — HOSPITAL ENCOUNTER (OUTPATIENT)
Dept: CARDIAC REHAB | Age: 67
Setting detail: THERAPIES SERIES
Discharge: HOME | End: 2022-05-06
Payer: MEDICARE

## 2022-05-06 PROCEDURE — 93798 PHYS/QHP OP CAR RHAB W/ECG: CPT

## 2022-05-09 ENCOUNTER — HOSPITAL ENCOUNTER (OUTPATIENT)
Dept: CARDIAC REHAB | Age: 67
Setting detail: THERAPIES SERIES
Discharge: HOME | End: 2022-05-09
Payer: MEDICARE

## 2022-05-09 PROCEDURE — 93798 PHYS/QHP OP CAR RHAB W/ECG: CPT

## 2022-05-11 ENCOUNTER — HOSPITAL ENCOUNTER (OUTPATIENT)
Dept: CARDIAC REHAB | Age: 67
Setting detail: THERAPIES SERIES
Discharge: HOME | End: 2022-05-11
Payer: MEDICARE

## 2022-05-11 PROCEDURE — 93798 PHYS/QHP OP CAR RHAB W/ECG: CPT

## 2022-05-13 ENCOUNTER — HOSPITAL ENCOUNTER (OUTPATIENT)
Dept: CARDIAC REHAB | Age: 67
Setting detail: THERAPIES SERIES
Discharge: HOME | End: 2022-05-13
Payer: MEDICARE

## 2022-05-13 PROCEDURE — 93798 PHYS/QHP OP CAR RHAB W/ECG: CPT

## 2022-05-16 ENCOUNTER — HOSPITAL ENCOUNTER (OUTPATIENT)
Dept: CARDIAC REHAB | Age: 67
Setting detail: THERAPIES SERIES
Discharge: HOME | End: 2022-05-16
Payer: MEDICARE

## 2022-05-16 PROCEDURE — 93798 PHYS/QHP OP CAR RHAB W/ECG: CPT

## 2022-05-18 ENCOUNTER — HOSPITAL ENCOUNTER (OUTPATIENT)
Dept: CARDIAC REHAB | Age: 67
Setting detail: THERAPIES SERIES
Discharge: HOME | End: 2022-05-18
Payer: MEDICARE

## 2022-05-18 PROCEDURE — 93798 PHYS/QHP OP CAR RHAB W/ECG: CPT

## 2022-05-20 ENCOUNTER — HOSPITAL ENCOUNTER (OUTPATIENT)
Dept: CARDIAC REHAB | Age: 67
Setting detail: THERAPIES SERIES
Discharge: HOME | End: 2022-05-20
Payer: MEDICARE

## 2022-05-20 PROCEDURE — 93798 PHYS/QHP OP CAR RHAB W/ECG: CPT

## 2022-05-23 ENCOUNTER — HOSPITAL ENCOUNTER (OUTPATIENT)
Dept: CARDIAC REHAB | Age: 67
Setting detail: THERAPIES SERIES
Discharge: HOME | End: 2022-05-23
Payer: MEDICARE

## 2022-05-23 PROCEDURE — 93798 PHYS/QHP OP CAR RHAB W/ECG: CPT

## 2022-05-25 ENCOUNTER — HOSPITAL ENCOUNTER (OUTPATIENT)
Dept: CARDIAC REHAB | Age: 67
Setting detail: THERAPIES SERIES
Discharge: HOME | End: 2022-05-25
Payer: MEDICARE

## 2022-05-25 PROCEDURE — 93798 PHYS/QHP OP CAR RHAB W/ECG: CPT

## 2022-05-25 ASSESSMENT — PATIENT HEALTH QUESTIONNAIRE - PHQ9
2. FEELING DOWN, DEPRESSED OR HOPELESS: 0
SUM OF ALL RESPONSES TO PHQ9 QUESTIONS 1 & 2: 0
SUM OF ALL RESPONSES TO PHQ QUESTIONS 1-9: 0
SUM OF ALL RESPONSES TO PHQ QUESTIONS 1-9: 0
1. LITTLE INTEREST OR PLEASURE IN DOING THINGS: 0
SUM OF ALL RESPONSES TO PHQ QUESTIONS 1-9: 0
SUM OF ALL RESPONSES TO PHQ QUESTIONS 1-9: 0

## 2022-05-25 ASSESSMENT — EJECTION FRACTION: EF_VALUE: 55

## 2022-05-25 ASSESSMENT — EXERCISE STRESS TEST: PEAK_BP: 104/60

## 2022-05-26 NOTE — PROGRESS NOTES
05/25/22 0854   Treatment Diagnosis   Treatment Diagnosis 1 PCI   NSTEMI Date 04/03/22   PCI Date 04/04/22   Referral Date 04/12/22   Oxygen Intervention   Oxygen Use No   O2 Sat Greater Than 90% Yes   Individual Treatment Plan   ITP Visit Type Re-assessment   1st Date of Exercise  04/29/22   ITP Next Review Date 06/22/22   EF% 55 %   Risk Stratification Low   Exercise    Stages of Change Action   Assisted Devices None   Exercise Prescription   Mode Treadmill;Ergometer; Other (comment)  (Nu-step)   Frequency per week 3  (Monday/Wednesday/Friday)   Duration Per Session 45-60  (pt regularly achieves 60 mins of cardio)   Intensity METS       2.3-4.3  (goal to achieve 3-5+)   RPE 12-16   Progression To increase cardiac endurance and strength   Symptoms with Exercise Other (comment)  (none)   Target Heart Rate   (UNM -15%)   Resistance Training Yes  (pt would like to increase strength)   Exercise Blood Pressures   Resting /63   Peak /60   Is BP WDL Yes   Exercise Activity at Home   Type Working around the house   Frequency everyday   Duration 20+ mintues   Resistance Training No   Exercise Education   Education Exercise safety;Signs/symptoms to report;RPE scale;Equipment orientation; Warm up/cool down;Physically active   Exercise Target Goal   Target Goal(s) Aerobic activity 30 + minutes/day  5 days/week; Individual exercise RX   Patient Stated Exercise Goals increase strength   Psychosocial   Stages of Change Maintenance  (PHQ-9 score = 0)   Psychosocial Intervention   Interventions Currently under treatment for depression  (continuing treatment for anxiety)   Consults PCP  (folloing with PCP for treatment)   Currently Taking Psychotropic Meds Yes  (BUSPAR PAXIL)   Medication Changes No   Psychosocial Education   Education Coping techniques;Relaxation techniques; Environmental triggers; Impact self care behaviors on health   Psychosocial Target Goals   Target Goal(s) Demonstrates appropriate interaction continue to achieve 60 mins of cardio at 3-5 METs within the next 60 days    Exercise Goal Status Progressing   Exercise Goal Assessment Recommendations;Key functional changes

## 2022-05-27 ENCOUNTER — HOSPITAL ENCOUNTER (OUTPATIENT)
Dept: CARDIAC REHAB | Age: 67
Setting detail: THERAPIES SERIES
Discharge: HOME | End: 2022-05-27
Payer: MEDICARE

## 2022-05-27 PROCEDURE — 93798 PHYS/QHP OP CAR RHAB W/ECG: CPT

## 2022-06-01 ENCOUNTER — HOSPITAL ENCOUNTER (OUTPATIENT)
Dept: CARDIAC REHAB | Age: 67
Setting detail: THERAPIES SERIES
Discharge: HOME | End: 2022-06-01
Payer: MEDICARE

## 2022-06-01 PROCEDURE — 93798 PHYS/QHP OP CAR RHAB W/ECG: CPT

## 2022-06-03 ENCOUNTER — HOSPITAL ENCOUNTER (OUTPATIENT)
Dept: CARDIAC REHAB | Age: 67
Setting detail: THERAPIES SERIES
Discharge: HOME | End: 2022-06-03
Payer: MEDICARE

## 2022-06-03 PROCEDURE — 93798 PHYS/QHP OP CAR RHAB W/ECG: CPT

## 2022-06-06 ENCOUNTER — HOSPITAL ENCOUNTER (OUTPATIENT)
Dept: CARDIAC REHAB | Age: 67
Setting detail: THERAPIES SERIES
Discharge: HOME | End: 2022-06-06
Payer: MEDICARE

## 2022-06-06 ENCOUNTER — TELEPHONE (OUTPATIENT)
Dept: CARDIAC REHAB | Age: 67
End: 2022-06-06

## 2022-06-06 PROCEDURE — 93798 PHYS/QHP OP CAR RHAB W/ECG: CPT

## 2022-06-06 NOTE — TELEPHONE ENCOUNTER
6/6/2022 1410 Nutrition: RD reviewed rate your plate form. Score 66 indicating patient is making many healthy food choices. Patient did complete 1 day food log. Patient is eating 3 meals and taking fluids. Plan to discuss at next available time. Will remain available.  Electronically signed by Trevin Demarco RD, LD on 6/6/22 at 2:11 PM EDT

## 2022-06-08 ENCOUNTER — HOSPITAL ENCOUNTER (OUTPATIENT)
Dept: CARDIAC REHAB | Age: 67
Setting detail: THERAPIES SERIES
Discharge: HOME | End: 2022-06-08
Payer: MEDICARE

## 2022-06-08 PROCEDURE — 93798 PHYS/QHP OP CAR RHAB W/ECG: CPT

## 2022-06-10 ENCOUNTER — HOSPITAL ENCOUNTER (OUTPATIENT)
Dept: CARDIAC REHAB | Age: 67
Setting detail: THERAPIES SERIES
Discharge: HOME | End: 2022-06-10
Payer: MEDICARE

## 2022-06-10 PROCEDURE — 93798 PHYS/QHP OP CAR RHAB W/ECG: CPT

## 2022-06-13 ENCOUNTER — HOSPITAL ENCOUNTER (OUTPATIENT)
Dept: CARDIAC REHAB | Age: 67
Setting detail: THERAPIES SERIES
Discharge: HOME | End: 2022-06-13
Payer: MEDICARE

## 2022-06-13 PROCEDURE — 93798 PHYS/QHP OP CAR RHAB W/ECG: CPT

## 2022-06-15 ENCOUNTER — HOSPITAL ENCOUNTER (OUTPATIENT)
Dept: CARDIAC REHAB | Age: 67
Setting detail: THERAPIES SERIES
Discharge: HOME | End: 2022-06-15
Payer: MEDICARE

## 2022-06-15 PROCEDURE — 93798 PHYS/QHP OP CAR RHAB W/ECG: CPT

## 2022-06-17 ENCOUNTER — HOSPITAL ENCOUNTER (OUTPATIENT)
Dept: CARDIAC REHAB | Age: 67
Setting detail: THERAPIES SERIES
Discharge: HOME | End: 2022-06-17
Payer: MEDICARE

## 2022-06-17 PROCEDURE — 93798 PHYS/QHP OP CAR RHAB W/ECG: CPT

## 2022-06-20 ENCOUNTER — HOSPITAL ENCOUNTER (OUTPATIENT)
Dept: CARDIAC REHAB | Age: 67
Setting detail: THERAPIES SERIES
Discharge: HOME | End: 2022-06-20
Payer: MEDICARE

## 2022-06-20 PROCEDURE — 93798 PHYS/QHP OP CAR RHAB W/ECG: CPT

## 2022-06-22 ENCOUNTER — HOSPITAL ENCOUNTER (OUTPATIENT)
Dept: CARDIAC REHAB | Age: 67
Setting detail: THERAPIES SERIES
Discharge: HOME | End: 2022-06-22
Payer: MEDICARE

## 2022-06-22 PROCEDURE — 93798 PHYS/QHP OP CAR RHAB W/ECG: CPT

## 2022-06-22 ASSESSMENT — EXERCISE STRESS TEST: PEAK_BP: 112/68

## 2022-06-22 ASSESSMENT — EJECTION FRACTION: EF_VALUE: 55

## 2022-06-22 NOTE — PROGRESS NOTES
06/22/22 1500   Treatment Diagnosis   Treatment Diagnosis 1 PCI   NSTEMI Date 04/03/22   PCI Date 04/04/22   Referral Date 04/12/22   Oxygen Intervention   Oxygen Use No   Individual Treatment Plan   ITP Visit Type Re-assessment   1st Date of Exercise  04/29/22   ITP Next Review Date 07/22/22   Visit #/Total Visits 24/36   EF% 55 %   Risk Stratification Low   ITP Exercise;Psychosocial;Tobacco;Nutrition;Education   Exercise    Stages of Change Action   Assisted Devices None   Exercise Prescription   Mode Treadmill;Ergometer; Other (comment)  (Nu-step)   Frequency per week 3  (Monday/Wednesday/Friday)   Duration Per Session 60  (pt regularly achieves 60 mins of cardio)   Intensity METS       2.3-4.3  (goal to achieve 3-5+)   RPE 12-16   Progression To increase cardiac endurance and strength   Symptoms with Exercise Other (comment)  (none)   Target Heart Rate   (UNM -15%)   Resistance Training Yes  (pt would like to increase strength)   Exercise Blood Pressures   Resting /70  (taken today)   Peak /68  (taken today)   Is BP WDL Yes   Exercise Activity at Home   Type Working around the house   Frequency everyday   Duration 20+ mintues   Resistance Training No   Exercise Education   Education Exercise safety;Signs/symptoms to report;RPE scale;Equipment orientation; Warm up/cool down;Physically active   Exercise Target Goal   Target Goal(s) Aerobic activity 30 + minutes/day  5 days/week; Individual exercise RX   Patient Stated Exercise Goals increase strength   Psychosocial   Stages of Change Maintenance  (PHQ-9 score = 0)   Psychosocial Intervention   Interventions Currently under treatment for depression  (Pt doing well in program stress has been low)   Medication Changes No   Psychosocial Education   Education Coping techniques;Relaxation techniques; Environmental triggers; Impact self care behaviors on health   Psychosocial Target Goals   Target Goal(s) Demonstrates appropriate interaction with others;Maximizes coping skills   Uses Stress Mgmt Techniques Yes   Patient Stated Psychosocial Goals none   Nutrition   Stages of Change Action   Diabetes No   Lipids   Date Lipids Drawn 04/18/22  (no new updates as of 6/22/22)   Total 110   HDL 28   LDL 69   Triglycerides 63   Lipid Med(s) LIPITOR   Lipid Med Change(s) No   Weight Management   Weight  188 lb (85.3 kg)   Height  6' 2\" (1.88 m)   BMI 24.19   Weight Goal 190   Alcohol None   Nutrition Intervention   Dietitian Consult No  (pt spoke to dietitian on phone following advice)   Nurse/Patient Discussion Yes  (HEART HEALTHY HANDOUTS GIVEN AND REVIEWED WITH PT.)   Nutrition Goals weight gain. short term goal 184-186lbs. long term goal:190lbs. 2300 cals/day. food diary. Nutrition Class No   Diabetes Education Referral No   Lipid Clinic Referral No   Weight Management Referral No   Nutrition Target Goals   Target Goals LDL-C less than 70 and non-HDL-C <100 for those with ASCVD;Triglycerides less than 150   Education   Learning Barrier Hearing   Knowledge Test Score 10/10   Education Intervention   Education Schedule Given Yes   Patient Education    Education CAD;Risk factors;Med compliance;Cardiac A&P;Signs/Symptoms of angina   Hypertension No   Hypertension Controlled Yes   Is BP WDL Yes   Med(s) Change No   BP Meds NONE   ACE/ARB Prescribed No   ASA Prescribed Yes   ASA Adherent Yes   BB Prescribed No   Statins Prescribed Yes   Statins Adherent Yes   Statin Intensity High   Education Target Goals   Target Goals Medication compliance; Risk factors; Understand target guidelines for lipids; Understand target guidelines for B/P   Patient Stated Education Goals exercise and diet to stay healthy   Treatment Goals   Goals Exercise;Education   Exercise Goal continue to achieve 60 mins of cardio at 3-5 METs within the next 60 days    Exercise Goal Status Progressing   Exercise Goal Assessment Recommendations;Key functional changes   Education Goal improve knowledge on cardiac diet, exercise, and risk factors by end of 36 sessions   Education Goal Status Initial   Education Goal Assessment Recommendations;Treatment time frame

## 2022-06-24 ENCOUNTER — HOSPITAL ENCOUNTER (OUTPATIENT)
Dept: CARDIAC REHAB | Age: 67
Setting detail: THERAPIES SERIES
Discharge: HOME | End: 2022-06-24
Payer: MEDICARE

## 2022-06-24 PROCEDURE — 93798 PHYS/QHP OP CAR RHAB W/ECG: CPT

## 2022-06-27 ENCOUNTER — HOSPITAL ENCOUNTER (OUTPATIENT)
Dept: CARDIAC REHAB | Age: 67
Setting detail: THERAPIES SERIES
Discharge: HOME | End: 2022-06-27
Payer: MEDICARE

## 2022-06-27 PROCEDURE — 93798 PHYS/QHP OP CAR RHAB W/ECG: CPT

## 2022-06-29 ENCOUNTER — HOSPITAL ENCOUNTER (OUTPATIENT)
Dept: CARDIAC REHAB | Age: 67
Setting detail: THERAPIES SERIES
Discharge: HOME | End: 2022-06-29
Payer: MEDICARE

## 2022-06-29 PROCEDURE — 93798 PHYS/QHP OP CAR RHAB W/ECG: CPT

## 2022-07-01 ENCOUNTER — HOSPITAL ENCOUNTER (OUTPATIENT)
Dept: CARDIAC REHAB | Age: 67
Setting detail: THERAPIES SERIES
Discharge: HOME | End: 2022-07-01
Payer: MEDICARE

## 2022-07-01 PROCEDURE — 93798 PHYS/QHP OP CAR RHAB W/ECG: CPT

## 2022-07-04 ENCOUNTER — HOSPITAL ENCOUNTER (OUTPATIENT)
Dept: CARDIAC REHAB | Age: 67
Setting detail: THERAPIES SERIES
End: 2022-07-04
Payer: MEDICARE

## 2022-07-06 ENCOUNTER — HOSPITAL ENCOUNTER (OUTPATIENT)
Dept: CARDIAC REHAB | Age: 67
Setting detail: THERAPIES SERIES
Discharge: HOME | End: 2022-07-06
Payer: MEDICARE

## 2022-07-06 PROCEDURE — 93798 PHYS/QHP OP CAR RHAB W/ECG: CPT

## 2022-07-08 ENCOUNTER — HOSPITAL ENCOUNTER (OUTPATIENT)
Dept: CARDIAC REHAB | Age: 67
Setting detail: THERAPIES SERIES
Discharge: HOME | End: 2022-07-08
Payer: MEDICARE

## 2022-07-08 PROCEDURE — 93798 PHYS/QHP OP CAR RHAB W/ECG: CPT

## 2022-07-11 ENCOUNTER — HOSPITAL ENCOUNTER (OUTPATIENT)
Dept: CARDIAC REHAB | Age: 67
Setting detail: THERAPIES SERIES
Discharge: HOME | End: 2022-07-11
Payer: MEDICARE

## 2022-07-11 PROCEDURE — 93798 PHYS/QHP OP CAR RHAB W/ECG: CPT

## 2022-07-13 ENCOUNTER — HOSPITAL ENCOUNTER (OUTPATIENT)
Dept: CARDIAC REHAB | Age: 67
Setting detail: THERAPIES SERIES
Discharge: HOME | End: 2022-07-13
Payer: MEDICARE

## 2022-07-13 PROCEDURE — 93798 PHYS/QHP OP CAR RHAB W/ECG: CPT

## 2022-07-15 ENCOUNTER — HOSPITAL ENCOUNTER (OUTPATIENT)
Dept: CARDIAC REHAB | Age: 67
Setting detail: THERAPIES SERIES
Discharge: HOME | End: 2022-07-15
Payer: MEDICARE

## 2022-07-15 PROCEDURE — 93798 PHYS/QHP OP CAR RHAB W/ECG: CPT

## 2022-07-18 ENCOUNTER — HOSPITAL ENCOUNTER (OUTPATIENT)
Dept: CARDIAC REHAB | Age: 67
Setting detail: THERAPIES SERIES
Discharge: HOME | End: 2022-07-18
Payer: MEDICARE

## 2022-07-18 PROCEDURE — 93798 PHYS/QHP OP CAR RHAB W/ECG: CPT

## 2022-07-20 ENCOUNTER — HOSPITAL ENCOUNTER (OUTPATIENT)
Dept: CARDIAC REHAB | Age: 67
Setting detail: THERAPIES SERIES
Discharge: HOME | End: 2022-07-20
Payer: MEDICARE

## 2022-07-20 PROCEDURE — 93798 PHYS/QHP OP CAR RHAB W/ECG: CPT

## 2022-07-22 ENCOUNTER — HOSPITAL ENCOUNTER (OUTPATIENT)
Dept: CARDIAC REHAB | Age: 67
Setting detail: THERAPIES SERIES
Discharge: HOME | End: 2022-07-22
Payer: MEDICARE

## 2022-07-22 PROCEDURE — 93798 PHYS/QHP OP CAR RHAB W/ECG: CPT

## 2022-07-22 ASSESSMENT — PATIENT HEALTH QUESTIONNAIRE - PHQ9
SUM OF ALL RESPONSES TO PHQ QUESTIONS 1-9: 0
2. FEELING DOWN, DEPRESSED OR HOPELESS: 0
SUM OF ALL RESPONSES TO PHQ9 QUESTIONS 1 & 2: 0
1. LITTLE INTEREST OR PLEASURE IN DOING THINGS: 0
SUM OF ALL RESPONSES TO PHQ QUESTIONS 1-9: 0

## 2022-07-22 ASSESSMENT — EXERCISE STRESS TEST
PEAK_BP: 118/64
PEAK_HR: 102
PEAK_BP: 118/64
PEAK_RPE: 15

## 2022-07-22 ASSESSMENT — EJECTION FRACTION: EF_VALUE: 55

## 2022-07-22 NOTE — PROGRESS NOTES
Patient has completed all 36 sessions. Final ITP Completed          07/22/22 1120   Treatment Diagnosis   Treatment Diagnosis 1 PCI   NSTEMI Date 04/03/22   PCI Date 04/04/22   Referral Date 04/12/22   Oxygen Intervention   Oxygen Use No   Individual Treatment Plan   ITP Visit Type Discharge, completed program   1st Date of Exercise  04/29/22   Visit #/Total Visits 36/36   EF% 55 %   Risk Stratification Low   ITP Exercise;Psychosocial;Tobacco;Nutrition;Education   Exercise    Stages of Change Action;Maintenance   Assisted Devices None   Data Measured Before Walk   Heart Rate 88   Blood Pressure 96/68   O2 Saturation 98   O2 Device Room air   RPE 7   Data Measured during Walk   Indicate Mode of RPE 6 minutes   RPE Data Measured During   Treadmill Speed 3.4 mph   Treadmill Grade 10 %   Symptoms Leg pain   Do You Have Shortness of Breath No   Peak RPE 15   Describe Type of Pain You Are Having tightness in legs   Data Measured Immediately After Walk   Distance Walked in  [employer] (miles)   (Used mets as progression)   Peak Heart Rate 102   Peak Blood Pressure 118/64   RPE 15   O2 Saturation 97   Data Measured at 5 Minutes After Walk   Heart Rate 80   Blood Pressure 98/60   SpO2 97 %   Comments Patient improved from 2 mets on pretest to 7 mets on post 6 min TM walk test   Exercise Prescription   Mode Treadmill;Ergometer; Other (comment)  (Nu-step)   Frequency per week 3  (Monday/Wednesday/Friday)   Duration Per Session 60-75   Intensity METS       3-8  (goal to achieve 3-5+)   RPE 12-16   Progression pt has met all exercise goals   Symptoms with Exercise Other (comment)  (none)   Target Heart Rate   (UNM -15%)   Resistance Training Yes  (pt would like to increase strength)   Exercise Blood Pressures   Resting BP 96/68  (taken today)   Peak /64  (taken today)   Is BP WDL Yes   Exercise Activity at Home   Type Working around the house   Frequency everyday   Duration 20+ mintues   Resistance Training No   Exercise Education   Education Exercise safety;Signs/symptoms to report;RPE scale;Equipment orientation; Warm up/cool down;Physically active   Exercise Target Goal   Target Goal(s) Aerobic activity 30 + minutes/day  5 days/week; Individual exercise RX   Patient Stated Exercise Goals increase strength   Psychosocial   Stages of Change Maintenance  (PHQ-9 score = 0)   Psychosocial Intervention   Interventions No intervention indicated   Medication Changes No   Psychosocial Education   Education Coping techniques;Relaxation techniques; Environmental triggers; Impact self care behaviors on health   Psychosocial Target Goals   Target Goal(s) Demonstrates appropriate interaction with others;Maximizes coping skills   Uses Stress Mgmt Techniques Yes   Patient Stated Psychosocial Goals none   Tobacco   Tobacco Use No  (pt smokes marijuana multiple times per day)   Nutrition   Stages of Change Action;Maintenance   Diabetes No   Lipids   Date Lipids Drawn 04/18/22  (no new updates as of 6/22/22)   Total 110   HDL 28   LDL 69   Triglycerides 63   Lipid Med(s) LIPITOR   Lipid Med Change(s) No   Weight Management   Weight  187 lb 12.8 oz (85.2 kg)   Height  6' 2\" (1.88 m)   BMI 24.16   Weight Goal 190   Alcohol None   Nutrition Intervention   Dietitian Consult No   Nurse/Patient Discussion Yes  (HEART HEALTHY HANDOUTS GIVEN AND REVIEWED WITH PT.)   Nutrition Goals weight gain. short term goal 184-186lbs. long term goal:190lbs. 2300 cals/day. food diary.     Nutrition Class No   Diabetes Education Referral No   Lipid Clinic Referral No   Weight Management Referral No   Nutrition Target Goals   Target Goals LDL-C less than 70 and non-HDL-C <100 for those with ASCVD;Triglycerides less than 150   Education   Learning Barrier Hearing   Knowledge Test Score 10/10   Education Intervention   Education Schedule Given Yes   Patient Education    Education CAD;Risk factors;Med compliance;Cardiac A&P;Signs/Symptoms of angina   Hypertension No Hypertension Controlled Yes   Is BP WDL Yes   Med(s) Change No   BP Meds NONE   ACE/ARB Prescribed No   ASA Prescribed Yes   ASA Adherent Yes   BB Prescribed No   Statins Prescribed Yes   Statins Adherent Yes   Statin Intensity High   Education Target Goals   Target Goals Medication compliance; Risk factors; Understand target guidelines for lipids; Understand target guidelines for B/P   Patient Stated Education Goals exercise and diet to stay healthy   Treatment Goals   Goals Exercise;Education   Exercise Goal continue to achieve 60 mins of cardio at 3-5 METs within the next 60 days    Exercise Goal Status Met   Exercise Goal Assessment Recommendations;Key functional changes   Education Goal improve knowledge on cardiac diet, exercise, and risk factors by end of 36 sessions   Education Goal Status Met   Education Goal Assessment Recommendations;Treatment time frame

## 2022-07-22 NOTE — PROGRESS NOTES
Shabnam Ann has completed 36/36 telemetry monitored exercise sessions. Using a variety of cardiovascular equipment, he is able to sustain an average of 70 minutes, at 3-8 MET level of intensity, with proper hemodynamic response. He has gained 2 pounds since beginning Cardiac Rehab. He has been asymptomatic during his exercise sessions. A home exercise program handout was given and reviewed. He plans to continue exercise on his own. Upon request, a detailed summary of his session readings can be sent for your review. Please call [facility] at [phone]. Thank you for allowing us to care for your patient.

## 2022-07-25 ENCOUNTER — APPOINTMENT (OUTPATIENT)
Dept: CARDIAC REHAB | Age: 67
End: 2022-07-25
Payer: MEDICARE

## 2022-07-27 ENCOUNTER — APPOINTMENT (OUTPATIENT)
Dept: CARDIAC REHAB | Age: 67
End: 2022-07-27
Payer: MEDICARE

## 2022-07-29 ENCOUNTER — APPOINTMENT (OUTPATIENT)
Dept: CARDIAC REHAB | Age: 67
End: 2022-07-29
Payer: MEDICARE